# Patient Record
Sex: FEMALE | Race: WHITE | NOT HISPANIC OR LATINO
[De-identification: names, ages, dates, MRNs, and addresses within clinical notes are randomized per-mention and may not be internally consistent; named-entity substitution may affect disease eponyms.]

---

## 2018-03-14 ENCOUNTER — APPOINTMENT (OUTPATIENT)
Dept: ORTHOPEDIC SURGERY | Facility: CLINIC | Age: 83
End: 2018-03-14
Payer: COMMERCIAL

## 2018-03-14 VITALS — HEIGHT: 66 IN | WEIGHT: 120 LBS | BODY MASS INDEX: 19.29 KG/M2

## 2018-03-14 DIAGNOSIS — Z78.9 OTHER SPECIFIED HEALTH STATUS: ICD-10-CM

## 2018-03-14 PROCEDURE — 99204 OFFICE O/P NEW MOD 45 MIN: CPT

## 2018-03-14 RX ORDER — LEVOTHYROXINE SODIUM 0.03 MG/1
25 TABLET ORAL
Refills: 0 | Status: ACTIVE | COMMUNITY

## 2018-03-14 RX ORDER — METAXALONE 800 MG/1
TABLET ORAL
Refills: 0 | Status: ACTIVE | COMMUNITY

## 2018-03-14 RX ORDER — APIXABAN 2.5 MG/1
2.5 TABLET, FILM COATED ORAL
Refills: 0 | Status: ACTIVE | COMMUNITY

## 2018-03-14 RX ORDER — AMIODARONE HYDROCHLORIDE 400 MG/1
TABLET ORAL
Refills: 0 | Status: ACTIVE | COMMUNITY

## 2018-03-14 RX ORDER — OMEGA-3/DHA/EPA/FISH OIL 300-1000MG
CAPSULE ORAL
Refills: 0 | Status: ACTIVE | COMMUNITY

## 2018-04-09 ENCOUNTER — FORM ENCOUNTER (OUTPATIENT)
Age: 83
End: 2018-04-09

## 2018-04-09 DIAGNOSIS — M79.604 PAIN IN RIGHT LEG: ICD-10-CM

## 2018-04-09 DIAGNOSIS — M79.605 PAIN IN RIGHT LEG: ICD-10-CM

## 2018-04-10 ENCOUNTER — APPOINTMENT (OUTPATIENT)
Dept: MRI IMAGING | Facility: HOSPITAL | Age: 83
End: 2018-04-10
Payer: COMMERCIAL

## 2018-04-10 ENCOUNTER — OUTPATIENT (OUTPATIENT)
Dept: OUTPATIENT SERVICES | Facility: HOSPITAL | Age: 83
LOS: 1 days | End: 2018-04-10
Payer: COMMERCIAL

## 2018-04-10 PROCEDURE — 72158 MRI LUMBAR SPINE W/O & W/DYE: CPT | Mod: 26

## 2018-04-10 PROCEDURE — A9585: CPT

## 2018-04-10 PROCEDURE — 72158 MRI LUMBAR SPINE W/O & W/DYE: CPT

## 2018-04-11 ENCOUNTER — FORM ENCOUNTER (OUTPATIENT)
Age: 83
End: 2018-04-11

## 2018-04-12 ENCOUNTER — APPOINTMENT (OUTPATIENT)
Dept: NEUROSURGERY | Facility: CLINIC | Age: 83
End: 2018-04-12
Payer: COMMERCIAL

## 2018-04-12 ENCOUNTER — OUTPATIENT (OUTPATIENT)
Dept: OUTPATIENT SERVICES | Facility: HOSPITAL | Age: 83
LOS: 1 days | End: 2018-04-12
Payer: COMMERCIAL

## 2018-04-12 VITALS
HEIGHT: 66 IN | TEMPERATURE: 97.4 F | OXYGEN SATURATION: 97 % | WEIGHT: 121 LBS | BODY MASS INDEX: 19.44 KG/M2 | SYSTOLIC BLOOD PRESSURE: 140 MMHG | RESPIRATION RATE: 16 BRPM | DIASTOLIC BLOOD PRESSURE: 85 MMHG | HEART RATE: 57 BPM

## 2018-04-12 DIAGNOSIS — M48.062 SPINAL STENOSIS, LUMBAR REGION WITH NEUROGENIC CLAUDICATION: ICD-10-CM

## 2018-04-12 DIAGNOSIS — Z85.9 PERSONAL HISTORY OF MALIGNANT NEOPLASM, UNSPECIFIED: ICD-10-CM

## 2018-04-12 DIAGNOSIS — M54.16 RADICULOPATHY, LUMBAR REGION: ICD-10-CM

## 2018-04-12 DIAGNOSIS — R26.9 UNSPECIFIED ABNORMALITIES OF GAIT AND MOBILITY: ICD-10-CM

## 2018-04-12 PROCEDURE — 99205 OFFICE O/P NEW HI 60 MIN: CPT

## 2018-04-12 PROCEDURE — 72110 X-RAY EXAM L-2 SPINE 4/>VWS: CPT

## 2018-04-12 PROCEDURE — 72110 X-RAY EXAM L-2 SPINE 4/>VWS: CPT | Mod: 26

## 2018-08-15 ENCOUNTER — INPATIENT (INPATIENT)
Facility: HOSPITAL | Age: 83
LOS: 1 days | Discharge: HOME | DRG: 552 | End: 2018-08-17
Attending: INTERNAL MEDICINE | Admitting: INTERNAL MEDICINE
Payer: COMMERCIAL

## 2018-08-15 VITALS
RESPIRATION RATE: 18 BRPM | TEMPERATURE: 99 F | OXYGEN SATURATION: 97 % | SYSTOLIC BLOOD PRESSURE: 166 MMHG | DIASTOLIC BLOOD PRESSURE: 99 MMHG | HEART RATE: 58 BPM

## 2018-08-15 DIAGNOSIS — Z98.890 OTHER SPECIFIED POSTPROCEDURAL STATES: Chronic | ICD-10-CM

## 2018-08-15 DIAGNOSIS — M48.061 SPINAL STENOSIS, LUMBAR REGION WITHOUT NEUROGENIC CLAUDICATION: ICD-10-CM

## 2018-08-15 DIAGNOSIS — M54.5 LOW BACK PAIN: ICD-10-CM

## 2018-08-15 DIAGNOSIS — I10 ESSENTIAL (PRIMARY) HYPERTENSION: ICD-10-CM

## 2018-08-15 DIAGNOSIS — Z29.9 ENCOUNTER FOR PROPHYLACTIC MEASURES, UNSPECIFIED: ICD-10-CM

## 2018-08-15 DIAGNOSIS — I48.2 CHRONIC ATRIAL FIBRILLATION: ICD-10-CM

## 2018-08-15 DIAGNOSIS — Z91.89 OTHER SPECIFIED PERSONAL RISK FACTORS, NOT ELSEWHERE CLASSIFIED: ICD-10-CM

## 2018-08-15 DIAGNOSIS — E03.9 HYPOTHYROIDISM, UNSPECIFIED: ICD-10-CM

## 2018-08-15 DIAGNOSIS — R63.8 OTHER SYMPTOMS AND SIGNS CONCERNING FOOD AND FLUID INTAKE: ICD-10-CM

## 2018-08-15 DIAGNOSIS — K59.00 CONSTIPATION, UNSPECIFIED: ICD-10-CM

## 2018-08-15 DIAGNOSIS — M43.10 SPONDYLOLISTHESIS, SITE UNSPECIFIED: ICD-10-CM

## 2018-08-15 LAB
ALBUMIN SERPL ELPH-MCNC: 3.5 G/DL — SIGNIFICANT CHANGE UP (ref 3.3–5)
ALP SERPL-CCNC: 72 U/L — SIGNIFICANT CHANGE UP (ref 40–120)
ALT FLD-CCNC: 13 U/L — SIGNIFICANT CHANGE UP (ref 10–45)
ANION GAP SERPL CALC-SCNC: 11 MMOL/L — SIGNIFICANT CHANGE UP (ref 5–17)
APTT BLD: 32.9 SEC — SIGNIFICANT CHANGE UP (ref 27.5–37.4)
AST SERPL-CCNC: 20 U/L — SIGNIFICANT CHANGE UP (ref 10–40)
BASOPHILS NFR BLD AUTO: 0.3 % — SIGNIFICANT CHANGE UP (ref 0–2)
BILIRUB SERPL-MCNC: 0.7 MG/DL — SIGNIFICANT CHANGE UP (ref 0.2–1.2)
BUN SERPL-MCNC: 24 MG/DL — HIGH (ref 7–23)
CALCIUM SERPL-MCNC: 9.9 MG/DL — SIGNIFICANT CHANGE UP (ref 8.4–10.5)
CHLORIDE SERPL-SCNC: 98 MMOL/L — SIGNIFICANT CHANGE UP (ref 96–108)
CO2 SERPL-SCNC: 28 MMOL/L — SIGNIFICANT CHANGE UP (ref 22–31)
CREAT SERPL-MCNC: 1.02 MG/DL — SIGNIFICANT CHANGE UP (ref 0.5–1.3)
EOSINOPHIL NFR BLD AUTO: 0.7 % — SIGNIFICANT CHANGE UP (ref 0–6)
GLUCOSE SERPL-MCNC: 89 MG/DL — SIGNIFICANT CHANGE UP (ref 70–99)
HCT VFR BLD CALC: 35.3 % — SIGNIFICANT CHANGE UP (ref 34.5–45)
HGB BLD-MCNC: 11.7 G/DL — SIGNIFICANT CHANGE UP (ref 11.5–15.5)
INR BLD: 1.12 — SIGNIFICANT CHANGE UP (ref 0.88–1.16)
LYMPHOCYTES # BLD AUTO: 9.8 % — LOW (ref 13–44)
MCHC RBC-ENTMCNC: 31.1 PG — SIGNIFICANT CHANGE UP (ref 27–34)
MCHC RBC-ENTMCNC: 33.1 G/DL — SIGNIFICANT CHANGE UP (ref 32–36)
MCV RBC AUTO: 93.9 FL — SIGNIFICANT CHANGE UP (ref 80–100)
MONOCYTES NFR BLD AUTO: 11.1 % — SIGNIFICANT CHANGE UP (ref 2–14)
NEUTROPHILS NFR BLD AUTO: 78.1 % — HIGH (ref 43–77)
PLATELET # BLD AUTO: 374 K/UL — SIGNIFICANT CHANGE UP (ref 150–400)
POTASSIUM SERPL-MCNC: 4.8 MMOL/L — SIGNIFICANT CHANGE UP (ref 3.5–5.3)
POTASSIUM SERPL-SCNC: 4.8 MMOL/L — SIGNIFICANT CHANGE UP (ref 3.5–5.3)
PROT SERPL-MCNC: 7 G/DL — SIGNIFICANT CHANGE UP (ref 6–8.3)
PROTHROM AB SERPL-ACNC: 12.5 SEC — SIGNIFICANT CHANGE UP (ref 9.8–12.7)
RBC # BLD: 3.76 M/UL — LOW (ref 3.8–5.2)
RBC # FLD: 14.7 % — SIGNIFICANT CHANGE UP (ref 10.3–16.9)
SODIUM SERPL-SCNC: 137 MMOL/L — SIGNIFICANT CHANGE UP (ref 135–145)
WBC # BLD: 7.6 K/UL — SIGNIFICANT CHANGE UP (ref 3.8–10.5)
WBC # FLD AUTO: 7.6 K/UL — SIGNIFICANT CHANGE UP (ref 3.8–10.5)

## 2018-08-15 PROCEDURE — 72100 X-RAY EXAM L-S SPINE 2/3 VWS: CPT | Mod: 26

## 2018-08-15 PROCEDURE — 99285 EMERGENCY DEPT VISIT HI MDM: CPT | Mod: 25

## 2018-08-15 PROCEDURE — 99222 1ST HOSP IP/OBS MODERATE 55: CPT | Mod: GC

## 2018-08-15 PROCEDURE — 99222 1ST HOSP IP/OBS MODERATE 55: CPT

## 2018-08-15 PROCEDURE — 93010 ELECTROCARDIOGRAM REPORT: CPT | Mod: 59

## 2018-08-15 RX ORDER — POLYETHYLENE GLYCOL 3350 17 G/17G
17 POWDER, FOR SOLUTION ORAL
Qty: 0 | Refills: 0 | Status: DISCONTINUED | OUTPATIENT
Start: 2018-08-15 | End: 2018-08-17

## 2018-08-15 RX ORDER — LEVOTHYROXINE SODIUM 125 MCG
0 TABLET ORAL
Qty: 0 | Refills: 0 | COMMUNITY

## 2018-08-15 RX ORDER — LEVOTHYROXINE SODIUM 125 MCG
0.25 TABLET ORAL
Qty: 0 | Refills: 0 | COMMUNITY

## 2018-08-15 RX ORDER — AMIODARONE HYDROCHLORIDE 400 MG/1
0 TABLET ORAL
Qty: 0 | Refills: 0 | COMMUNITY

## 2018-08-15 RX ORDER — AMIODARONE HYDROCHLORIDE 400 MG/1
200 TABLET ORAL
Qty: 0 | Refills: 0 | Status: DISCONTINUED | OUTPATIENT
Start: 2018-08-15 | End: 2018-08-17

## 2018-08-15 RX ORDER — APIXABAN 2.5 MG/1
2.5 TABLET, FILM COATED ORAL EVERY 12 HOURS
Qty: 0 | Refills: 0 | Status: DISCONTINUED | OUTPATIENT
Start: 2018-08-15 | End: 2018-08-17

## 2018-08-15 RX ORDER — DOCUSATE SODIUM 100 MG
100 CAPSULE ORAL THREE TIMES A DAY
Qty: 0 | Refills: 0 | Status: DISCONTINUED | OUTPATIENT
Start: 2018-08-15 | End: 2018-08-17

## 2018-08-15 RX ORDER — SENNA PLUS 8.6 MG/1
2 TABLET ORAL AT BEDTIME
Qty: 0 | Refills: 0 | Status: DISCONTINUED | OUTPATIENT
Start: 2018-08-15 | End: 2018-08-17

## 2018-08-15 RX ORDER — KETOROLAC TROMETHAMINE 30 MG/ML
15 SYRINGE (ML) INJECTION ONCE
Qty: 0 | Refills: 0 | Status: DISCONTINUED | OUTPATIENT
Start: 2018-08-15 | End: 2018-08-15

## 2018-08-15 RX ORDER — LEVOTHYROXINE SODIUM 125 MCG
25 TABLET ORAL DAILY
Qty: 0 | Refills: 0 | Status: DISCONTINUED | OUTPATIENT
Start: 2018-08-15 | End: 2018-08-17

## 2018-08-15 RX ORDER — APIXABAN 2.5 MG/1
0 TABLET, FILM COATED ORAL
Qty: 0 | Refills: 0 | COMMUNITY

## 2018-08-15 RX ORDER — AMIODARONE HYDROCHLORIDE 400 MG/1
200 TABLET ORAL
Qty: 0 | Refills: 0 | COMMUNITY

## 2018-08-15 RX ORDER — APIXABAN 2.5 MG/1
2.5 TABLET, FILM COATED ORAL
Qty: 0 | Refills: 0 | COMMUNITY

## 2018-08-15 RX ADMIN — Medication 100 MILLIGRAM(S): at 22:44

## 2018-08-15 RX ADMIN — Medication 15 MILLIGRAM(S): at 19:12

## 2018-08-15 RX ADMIN — APIXABAN 2.5 MILLIGRAM(S): 2.5 TABLET, FILM COATED ORAL at 22:44

## 2018-08-15 NOTE — H&P ADULT - PROBLEM SELECTOR PROBLEM 3
Chronic low back pain without sciatica, unspecified back pain laterality Chronic atrial fibrillation

## 2018-08-15 NOTE — H&P ADULT - PROBLEM SELECTOR PLAN 4
Blood pressure elevated in ED to 178/101. Patient does not report any history of HTN. Etiology likely 2/2 pain. Asymptomatic at this time, no NA, no visual changes.  - continue to monitor  - if persistent and SBP >180, can consider low-dose hydralazine

## 2018-08-15 NOTE — H&P ADULT - PROBLEM SELECTOR PLAN 2
Spinal stenosis at L3-L4 level with grade I anterolisthesis seen on MRI in April 2018.   - plan as above

## 2018-08-15 NOTE — CONSULT NOTE ADULT - SUBJECTIVE AND OBJECTIVE BOX
HISTORY OF PRESENT ILLNESS:   89yo F w/pmhx Afib, cardiac monitor, bladder CA 20 years ago, s/p L4 laminectomy 2016 in [facility] w/ residual R foot drop. Pt presented to  office in April 2018 w/ spinal stenosis and Grade 1 anterlolisthesis L3-L4 and chronic T12 compression fx. No neurosurgical intervention was recommended by . Pt now presents to ER c/o worsening LBP since end of July.   Pt takes tylenol for pain. Pt tried percocet but that made her too drowsy. She ambulates w/walker. Denies leg radiculopathy, urinary incontinence or numbness in extremities. No recent trauma or falls.       PAST MEDICAL & SURGICAL HISTORY:  H/O laminectomy        Allergies    No Known Allergies    Intolerances        REVIEW OF SYSTEMS  see HPI      MEDICATIONS:  Antibiotics:    Neuro:    Anticoagulation:    OTHER:    IVF:      Vital Signs Last 24 Hrs  T(C): 37.1 (15 Aug 2018 15:58), Max: 37.1 (15 Aug 2018 15:58)  T(F): 98.8 (15 Aug 2018 15:58), Max: 98.8 (15 Aug 2018 15:58)  HR: 58 (15 Aug 2018 15:58) (58 - 58)  BP: 178/101 (15 Aug 2018 16:01) (166/99 - 178/101)  BP(mean): --  RR: 18 (15 Aug 2018 15:58) (18 - 18)  SpO2: 97% (15 Aug 2018 15:58) (97% - 97%)    PHYSICAL EXAM:  AxOx3  + back tenderness to palpation around T12  R foot 3/5 DF, 3+/5PF otherwise 5/5 throughout  sensory grossly intact      LABS:              CULTURES:      RADIOLOGY & ADDITIONAL STUDIES:  < from: MR Lumbar Spine w/wo IV Cont (04.10.18 @ 16:55) >  Advanced multilevel degenerative disc disease and facet arthropathy, as   detailed above.    In this patient is status post prior L4 laminectomy, there is no residual   or recurrent focal disc herniation at L4-5 as clinically questioned.     Greatest spinal canal stenosis is seen at L3-4 where there is grade 1   anterolisthesis, however the central canal is partially decompressed at   this laminectomy level. Multilevel neural foraminal narrowing, as   detailed above.    Chronic T12 compression deformity.    < end of copied text > HISTORY OF PRESENT ILLNESS:   89yo F w/pmhx Afib, cardiac monitor, bladder CA 20 years ago, s/p L4 laminectomy 2016 in [facility] w/ residual R foot drop. Pt presented to  office in April 2018 w/ spinal stenosis and Grade 1 anterlolisthesis L3-L4 and chronic T12 compression fx. No neurosurgical intervention was recommended by . Pt now presents to ER c/o worsening LBP since end of July.   Pt takes tylenol for pain. Pt tried percocet but that made her too drowsy. She ambulates w/walker. Today pt was unable to ambulate due to pain and family called an ambulance to bring her to ER. Pt has been seeing  pain management and pt has tried multiple epidural injection, some w/ relief.  Denies leg radiculopathy, urinary incontinence or numbness in extremities. No recent trauma or falls.       PAST MEDICAL & SURGICAL HISTORY:  H/O laminectomy        Allergies    No Known Allergies    Intolerances        REVIEW OF SYSTEMS  see HPI      MEDICATIONS:  Antibiotics:    Neuro:    Anticoagulation:    OTHER:    IVF:      Vital Signs Last 24 Hrs  T(C): 37.1 (15 Aug 2018 15:58), Max: 37.1 (15 Aug 2018 15:58)  T(F): 98.8 (15 Aug 2018 15:58), Max: 98.8 (15 Aug 2018 15:58)  HR: 58 (15 Aug 2018 15:58) (58 - 58)  BP: 178/101 (15 Aug 2018 16:01) (166/99 - 178/101)  BP(mean): --  RR: 18 (15 Aug 2018 15:58) (18 - 18)  SpO2: 97% (15 Aug 2018 15:58) (97% - 97%)    PHYSICAL EXAM:  AxOx3  + back tenderness to palpation around T12  R foot 2/5 DF, 3+/5 PF otherwise 5/5 throughout  sensory grossly intact      LABS:              CULTURES:      RADIOLOGY & ADDITIONAL STUDIES:  < from: MR Lumbar Spine w/wo IV Cont (04.10.18 @ 16:55) >  Advanced multilevel degenerative disc disease and facet arthropathy, as   detailed above.    In this patient is status post prior L4 laminectomy, there is no residual   or recurrent focal disc herniation at L4-5 as clinically questioned.     Greatest spinal canal stenosis is seen at L3-4 where there is grade 1   anterolisthesis, however the central canal is partially decompressed at   this laminectomy level. Multilevel neural foraminal narrowing, as   detailed above.    Chronic T12 compression deformity.    < end of copied text >

## 2018-08-15 NOTE — H&P ADULT - NSHPREVIEWOFSYSTEMS_GEN_ALL_CORE
No recent illnesses, no fevers or chills, no chest pain or palpitations. ROS otherwise negative except as per HPI.

## 2018-08-15 NOTE — H&P ADULT - PROBLEM SELECTOR PLAN 3
Chronic AFib with implanted cardiac monitor. On home Eliquis and amiodarone. EKG in ED NSR with first degree AV block, LAD.  - continue home Eliquis 2.5mg BID  - continue home amiodarone 200mg BID Chronic AFib with implanted cardiac monitor. On home Eliquis and amiodarone. EKG in ED irregular, bradycardic, prolonged QTc to 492, LAD.  - continue home Eliquis 2.5mg BID  - continue home amiodarone 200mg BID  - consult EP to interrogate patient's implanted cardiac device

## 2018-08-15 NOTE — H&P ADULT - PROBLEM SELECTOR PLAN 1
Patient has a long history of low back pain but presents with acute worsening for the past two weeks. No signs or symptoms of cord compression or disc herniation. She has a known history of spinal stenosis and lumbar compression fracture s/p L4 laminectomy in 2016. Most recent MRI in April 2018 shows spinal stenosis and grade I anterolisthesis at L3-L4, T12 compression fracture, and advanced DJD and facet arthropathy. Low concern for cord compression at this time.  - seen by NSG in ED, no surgical intervention at this time  - assess response to Toradol 15mg IV given in ED  - per NSG recs, patient needs back brace for OOB, pain management consult with Dr. Jean Claude Daigle, and PT and pain control  - PT  - pain control  - f/u XR L-spine Patient has a long history of low back pain but presents with acute worsening for the past two weeks. No signs or symptoms of cord compression or disc herniation. She has a known history of spinal stenosis and lumbar compression fracture s/p L4 laminectomy in 2016. Most recent MRI in April 2018 shows spinal stenosis and grade I anterolisthesis at L3-L4, T12 compression fracture, and advanced DJD and facet arthropathy. Low concern for cord compression at this time.  - seen by NSG in ED, no surgical intervention at this time  - assess response to Toradol 15mg IV given in ED  - per NSG recs, patient needs back brace for OOB, pain management consult with Dr. Jean Claude Daigle, and PT and pain control  - PT  - pain control  - per NSG note attending addendum in NSG note, MRI can be considered but is not necessary at this time as it would not change plan for conservative management  - f/u XR L-spine Patient has a long history of low back pain but presents with acute worsening for the past two weeks. No signs or symptoms of cord compression or disc herniation. She has a known history of spinal stenosis and lumbar compression fracture s/p L4 laminectomy in 2016. Most recent MRI in April 2018 shows spinal stenosis and grade I anterolisthesis at L3-L4, T12 compression fracture, and advanced DJD and facet arthropathy. Low concern for cord compression at this time.  - seen by NSG in ED, no surgical intervention at this time  - assess response to Toradol 15mg IV given in ED  - per NSG recs, patient needs back brace for OOB, pain management consult with Dr. Jean Claude Daigle, and PT and pain control  - PT  - pain control  - per NSG note attending addendum in NSG note, MRI can be considered but is not necessary at this time as it would not change plan for conservative management  - f/u XR L-spine final read -- per prelim read by radiology, patient may have worsening compression at L2/L3/L4 vertebrae Patient has a long history of low back pain but presents with acute worsening for the past two weeks. No signs or symptoms of cord compression or disc herniation. She has a known history of spinal stenosis and lumbar compression fracture s/p L4 laminectomy in 2016. Most recent MRI in April 2018 shows spinal stenosis and grade I anterolisthesis at L3-L4, T12 compression fracture, and advanced DJD and facet arthropathy. Low concern for cord compression at this time.  - seen by NSG in ED, no surgical intervention at this time  - assess response to Toradol 15mg IV given in ED  - per NSG recs, patient needs back brace for OOB, pain management consult with Dr. Jean Claude Daigle, and PT and pain control  - PT  - pain control  - per NSG note attending addendum in NSG note, MRI can be considered but is not necessary at this time as it would not change plan for conservative management  - f/u XR L-spine final read -- per prelim read by radiology, patient may have worsening compression at L2/L3/L4 vertebrae  -Will have discussion with family/NSG in AM regarding possible intervention - would consider IR Kyphoplasty?

## 2018-08-15 NOTE — ED ADULT TRIAGE NOTE - CHIEF COMPLAINT QUOTE
patient BIBA from home complains of non traumatic back pain x 2 weeks now with difficulty ambulating. patient was out of town for 2 weeks unable to see a doctor. hx of laminectomy in 2016. hypertensive in triage, denies hx of HTN. denies chest pain, sob, fever, chills

## 2018-08-15 NOTE — ED PROVIDER NOTE - CONSTITUTIONAL, MLM
normal... frail appearing, well nourished, awake, alert, oriented to person, place, time/situation and in no apparent distress.

## 2018-08-15 NOTE — H&P ADULT - ASSESSMENT
89yo F with PMH of chronic low back pain s/p L4 laminectomy (2016, [facility]) with residual foot drop, AFib with cardiac monitor, hypothyroidism, and bladder cancer (1998), who was brought into the ED by family for worsening low back pain at home resulting in inability to ambulate. Seen by NSG, no surgical intervention. Admitted for pain control and PT.

## 2018-08-15 NOTE — H&P ADULT - HISTORY OF PRESENT ILLNESS
89yo F with PMH of chronic low back pain s/p L4 laminectomy (2016, [facility]) with residual foot drop, AFib with cardiac monitor, hypothyroidism, and bladder cancer (1998), who was brought into the ED by family for worsening low back pain at home resulting in inability to ambulate. Pain has been worsening progressively over two weeks with progressive difficulty walking. No radiculopathy, no paresthesias, no numbness, no bowel or bladder dysfunction. Patient seen by Dr. Frederick with neurosurgery in April, MRI at that time showed spinal stenosis and grade I anterolisthesis at L3-L4, chronic T12 compression fracture, and multilevel degenerative disc disease and facet arthropathy.     ED Course: Vitals on arrival T 98.8, HR 58, /99, RR 18, SpO2 97%. She was seen by NSG who recommended NSAIDs and PT, no surgical intervention at this time. Admitted for inability to manage ADLs and inability of family to take care of her at home.   . 89yo F with PMH of chronic low back pain s/p L4 laminectomy (2016, [facility]) with residual foot drop, AFib with cardiac monitor, hypothyroidism, and bladder cancer (1998), who was brought into the ED by family for worsening low back pain at home resulting in inability to ambulate. Pain worsened acutely two weeks ago, 10/10, sharp, non-radiating, better with rest; patient has been using a wheelchair and a walker since that time. As even trying to get out of bed was becoming unbearable, patient decided to come into the ED. No preceding trauma, no clear inciting factor, but patient reports she had been doing some bending over prior to the exacerbation of her pain. No radiculopathy, no paresthesias, no numbness, no bowel or bladder dysfunction. She does report some mild generalized abdominal discomfort with associated constipation. Last BM 2 days ago. She also reports nausea associated with the pain, no vomiting. She also reports recent onset of lower extremity swelling, for which her doctor prescribed Lasix; she is unsure if this helped. She describes shortness of breath on exertion, though she associates this with her pain. Patient seen by Dr. Frederick with neurosurgery in April, MRI at that time showed spinal stenosis and grade I anterolisthesis at L3-L4, chronic T12 compression fracture, and multilevel degenerative disc disease and facet arthropathy. Tylenol helps her pain minimally; she has tried Percocet but did not tolerate it due to drowsiness and nausea. She has had two epidural injections, last in June 2018, with Dr. Jean Claude Daigle. Last injection did not help alleviate her pain.   ED Course: Vitals on arrival T 98.8, HR 58, /99, RR 18, SpO2 97%. She was seen by NSG who recommended NSAIDs and PT, no surgical intervention at this time. Admitted for inability to manage ADLs and inability of family to take care of her at home. Patient given Toradol 15mg IV x1 in ED and admitted for pain control and physical therapy. 89yo F with PMH of chronic low back pain s/p L4 laminectomy (2016, [facility]) with residual foot drop, AFib with cardiac monitor, hypothyroidism, and bladder cancer (1998), who was brought into the ED by family for worsening low back pain at home resulting in inability to ambulate. Pain worsened acutely two weeks ago, 10/10, sharp, non-radiating, better with rest; patient has been using a wheelchair and a walker since that time. As even trying to get out of bed was becoming unbearable, patient decided to come into the ED. No preceding trauma, no clear inciting factor, but patient reports she had been doing some bending over prior to the exacerbation of her pain. No radiculopathy, no paresthesias, no numbness, no bowel or bladder dysfunction. She does report some mild generalized abdominal discomfort with associated constipation. Last BM 2 days ago. She also reports nausea associated with the pain, no vomiting. She also reports recent onset of lower extremity swelling, for which her doctor prescribed Lasix; she is unsure if this helped. She describes shortness of breath on exertion, though she associates this with her pain. Patient seen by Dr. Frederick with neurosurgery in April, MRI at that time showed spinal stenosis and grade I anterolisthesis at L3-L4, chronic T12 compression fracture, and multilevel degenerative disc disease and facet arthropathy. Tylenol helps her pain minimally; she has tried Percocet but did not tolerate it due to drowsiness and nausea. She has had two epidural injections, last in June 2018, with Dr. Jean Claude Daigle. Last injection did not help alleviate her pain.   ED Course: Vitals on arrival T 98.8, HR 58, /99, RR 18, SpO2 97%. She was seen by NSG who recommended NSAIDs and PT, no surgical intervention at this time. Admitted for inability to manage ADLs and inability of family to take care of her at home. Patient given Toradol 15mg IV x1 in ED and admitted for pain control and physical therapy.    FH: Denies cardiac history in father

## 2018-08-15 NOTE — ED ADULT NURSE NOTE - NSIMPLEMENTINTERV_GEN_ALL_ED
Implemented All Fall with Harm Risk Interventions:  [place] to call system. Call bell, personal items and telephone within reach. Instruct patient to call for assistance. Room bathroom lighting operational. Non-slip footwear when patient is off stretcher. Physically safe environment: no spills, clutter or unnecessary equipment. Stretcher in lowest position, wheels locked, appropriate side rails in place. Provide visual cue, wrist band, yellow gown, etc. Monitor gait and stability. Monitor for mental status changes and reorient to person, place, and time. Review medications for side effects contributing to fall risk. Reinforce activity limits and safety measures with patient and family. Provide visual clues: red socks.

## 2018-08-15 NOTE — H&P ADULT - NSHPSOCIALHISTORY_GEN_ALL_CORE
Lives at home with her . Previously worked as a sculptor. Remote smoking history, 15 pack-years, stopped in 1960. No other drug use. Drinks about one alcoholic beverage a day with dinner.

## 2018-08-15 NOTE — H&P ADULT - NSHPPHYSICALEXAM_GEN_ALL_CORE
PHYSICAL EXAM:  General: thin elderly female, NAD, lying in bed, appears comfortable, cooperative with exam  HEENT: NCAT, PERRL, EOMI, no conjunctival pallor or scleral icterus, MM dry, good dentition  Neck: supple, no LAD or thyromegaly, no JVD appreciated  Respiratory: no increased work of breathing, CTAB, no w/r/r appreciated  Cardiovascular: irregular rhythm, regular rate, 2/6 GWEN at RUSB, normal S1/S2, no r/g appreciated  Abdomen: soft, NTND, +BS, no guarding or rebound tenderness, no palpable masses   Extremities: WWP, no cyanosis or clubbing, no edema  Vascular: radial pulses and DP 1+ bilaterally   Neurological: AAOx3, CN II-XII intact, strength 4/5 in bilateral upper and lower extremities, sensation to light touch intact in all extremities, patient unable to sit forward 2/2 back pain, unable to assess midline tenderness; SLR negative though patient describes pain in R hip with L SLR, no TTP over either hip; gait deferred  Skin: no gross skin abnormalities or rashes PHYSICAL EXAM:  General: thin elderly female, NAD, lying in bed, appears comfortable, cooperative with exam  HEENT: NCAT, PERRL, EOMI, no conjunctival pallor or scleral icterus, MM dry, good dentition  Neck: supple, no LAD or thyromegaly, no JVD appreciated  Respiratory: no increased work of breathing, CTAB, no w/r/r appreciated  Cardiovascular: irregular rhythm, regular rate, 2/6 GWEN at RUSB, normal S1/S2, no r/g appreciated  Abdomen: soft, NTND, +BS, no guarding or rebound tenderness, no palpable masses   Extremities: WWP, no cyanosis or clubbing, no edema  Vascular: radial pulses and DP 1+ bilaterally   Neurological: AAOx3, CN II-XII intact, strength 4/5 in bilateral upper and lower extremities, sensation to light touch intact in all extremities, patient unable to sit forward >45 degrees 2/2 severe sharp midline low back pain, unable to assess midline tenderness; SLR negative though patient describes pain in R hip with L SLR, no TTP over either hip; gait deferred  Skin: no gross skin abnormalities or rashes

## 2018-08-15 NOTE — ED PROVIDER NOTE - OBJECTIVE STATEMENT
history of chronic back pain here with increased pain for the past 2-3 weeks.  Denies trauma, fever/chills, bowel/bladder dysfunction.  States the pain has progressed to the point she can't walk anymore.  Taking tylenol at home without relief.  Has tried percocet, tramadol, tylenol #3 in the past but didn't tolerate the side effects.  Has had epidural injections but don't seem to help anymore.  Was seen by Dr. Frederick who recommended medical/ not surgical management. Last MRI done in April showing advanced DJD, prior L4 laminectomy, spinal stenosis.  Pain increased with movement, better with rest

## 2018-08-15 NOTE — ED PROVIDER NOTE - MEDICAL DECISION MAKING DETAILS
acute on chronic low back pain.  now unable to do adl's at home.  no focal neuro deficit/ signs of cauda equina.  seen by neurosurgery service in the ED who recommended lumbar xray, admission to medicine for pain control with nsaids, brace, and eval by pain management/ PT.  family agreeable and states they cannot manage her at home in the current state

## 2018-08-15 NOTE — H&P ADULT - NSHPLABSRESULTS_GEN_ALL_CORE
LABS:                        11.7   7.6   )-----------( 374      ( 15 Aug 2018 17:16 )             35.3     08-15    137  |  98  |  24<H>  ----------------------------<  89  4.8   |  28  |  1.02    Ca    9.9      15 Aug 2018 17:16    TPro  7.0  /  Alb  3.5  /  TBili  0.7  /  DBili  x   /  AST  20  /  ALT  13  /  AlkPhos  72  08-15    PT/INR - ( 15 Aug 2018 17:16 )   PT: 12.5 sec;   INR: 1.12     PTT - ( 15 Aug 2018 17:16 )  PTT:32.9 sec      RADIOLOGY & ADDITIONAL TESTS:  XR L-spine 8/15/18  pending official read    MR Lumbar Spine w/wo IV Cont (04.10.18 @ 16:55)  IMPRESSION:   1. Advanced multilevel degenerative disc disease and facet arthropathy, as detailed above.  2. In this patient is status post prior L4 laminectomy, there is no residual or recurrent focal disc herniation at L4-5 as clinically questioned.   3. Greatest spinal canal stenosis is seen at L3-4 where there is grade 1 anterolisthesis, however the central canal is partially decompressed at this laminectomy level. Multilevel neural foraminal narrowing, as detailed above.  4. Chronic T12 compression deformity. LABS:                        11.7   7.6   )-----------( 374      ( 15 Aug 2018 17:16 )             35.3     08-15    137  |  98  |  24<H>  ----------------------------<  89  4.8   |  28  |  1.02    Ca    9.9      15 Aug 2018 17:16    TPro  7.0  /  Alb  3.5  /  TBili  0.7  /  DBili  x   /  AST  20  /  ALT  13  /  AlkPhos  72  08-15    PT/INR - ( 15 Aug 2018 17:16 )   PT: 12.5 sec;   INR: 1.12     PTT - ( 15 Aug 2018 17:16 )  PTT:32.9 sec      RADIOLOGY & ADDITIONAL TESTS:  XR L-spine 8/15/18  pending official read    MR Lumbar Spine w/wo IV Cont (04.10.18 @ 16:55)  IMPRESSION:   1. Advanced multilevel degenerative disc disease and facet arthropathy.  2. In this patient is status post prior L4 laminectomy, there is no residual or recurrent focal disc herniation at L4-5 as clinically questioned.   3. Greatest spinal canal stenosis is seen at L3-4 where there is grade 1 anterolisthesis, however the central canal is partially decompressed at this laminectomy level. Multilevel neural foraminal narrowing, as detailed above.  4. Chronic T12 compression deformity. LABS:                        11.7   7.6   )-----------( 374      ( 15 Aug 2018 17:16 )             35.3     08-15    137  |  98  |  24<H>  ----------------------------<  89  4.8   |  28  |  1.02    Ca    9.9      15 Aug 2018 17:16    TPro  7.0  /  Alb  3.5  /  TBili  0.7  /  DBili  x   /  AST  20  /  ALT  13  /  AlkPhos  72  08-15    PT/INR - ( 15 Aug 2018 17:16 )   PT: 12.5 sec;   INR: 1.12     PTT - ( 15 Aug 2018 17:16 )  PTT:32.9 sec      RADIOLOGY & ADDITIONAL TESTS:  XR L-spine 8/15/18, final read pending  Per prelim read by radiology, patient may have worsening compression at L2/L3/L4 vertebrae.    MR Lumbar Spine w/wo IV Cont (04.10.18 @ 16:55)  IMPRESSION:   1. Advanced multilevel degenerative disc disease and facet arthropathy.  2. In this patient is status post prior L4 laminectomy, there is no residual or recurrent focal disc herniation at L4-5 as clinically questioned.   3. Greatest spinal canal stenosis is seen at L3-4 where there is grade 1 anterolisthesis, however the central canal is partially decompressed at this laminectomy level. Multilevel neural foraminal narrowing, as detailed above.  4. Chronic T12 compression deformity.

## 2018-08-15 NOTE — CONSULT NOTE ADULT - ATTENDING COMMENTS
Patient seen and examined by me. She has known chronic degenerative lumbar spondylosis. Review of images from April this year show that she has moderate focal stenosis above her previous laminectomy at L3-4. There is also a chronic T12 compression fracture as well as multilevel degenerative changes. She has previously been evaluated as an outpatient and advised to seeks conservative measures. On examination she has no focal deficits but her examination is pain limited. She seems to be quite comfortable while in bed. Given her advanced age and medical comorbidities, no neurosurgical intervention to be offered for these chronic spine issues. Recommend lumbar AP and lateral x-rays to evaluate status of compression fractures/alignment changes,  TLSO back brace to be used prn for comfort, evaluation by pain management, physical therapy. Family requesting MRI which is fine but not likely to change the conservative management recommendations above.    Kade Zhang M.D.  Neurosurgery

## 2018-08-15 NOTE — H&P ADULT - PROBLEM SELECTOR PLAN 6
Patient reports worsening constipation over past 1-2 weeks with no BM for 6 days until 2 days prior to admission.  - TSH  - Colace, senna  - advance bowel regimen as tolerated

## 2018-08-15 NOTE — H&P ADULT - PROBLEM SELECTOR PROBLEM 1
Spinal stenosis of lumbar region without neurogenic claudication Chronic low back pain without sciatica, unspecified back pain laterality

## 2018-08-15 NOTE — H&P ADULT - PMH
Anterolisthesis    Atrial fibrillation    Bladder cancer    Hypothyroidism    Spinal stenosis of lumbar region without neurogenic claudication

## 2018-08-16 LAB
ANION GAP SERPL CALC-SCNC: 13 MMOL/L — SIGNIFICANT CHANGE UP (ref 5–17)
BUN SERPL-MCNC: 25 MG/DL — HIGH (ref 7–23)
CALCIUM SERPL-MCNC: 9.4 MG/DL — SIGNIFICANT CHANGE UP (ref 8.4–10.5)
CHLORIDE SERPL-SCNC: 101 MMOL/L — SIGNIFICANT CHANGE UP (ref 96–108)
CO2 SERPL-SCNC: 26 MMOL/L — SIGNIFICANT CHANGE UP (ref 22–31)
CREAT SERPL-MCNC: 1.05 MG/DL — SIGNIFICANT CHANGE UP (ref 0.5–1.3)
GLUCOSE SERPL-MCNC: 92 MG/DL — SIGNIFICANT CHANGE UP (ref 70–99)
HCT VFR BLD CALC: 34.7 % — SIGNIFICANT CHANGE UP (ref 34.5–45)
HGB BLD-MCNC: 11.2 G/DL — LOW (ref 11.5–15.5)
MAGNESIUM SERPL-MCNC: 2.2 MG/DL — SIGNIFICANT CHANGE UP (ref 1.6–2.6)
MCHC RBC-ENTMCNC: 31 PG — SIGNIFICANT CHANGE UP (ref 27–34)
MCHC RBC-ENTMCNC: 32.3 G/DL — SIGNIFICANT CHANGE UP (ref 32–36)
MCV RBC AUTO: 96.1 FL — SIGNIFICANT CHANGE UP (ref 80–100)
PHOSPHATE SERPL-MCNC: 3.1 MG/DL — SIGNIFICANT CHANGE UP (ref 2.5–4.5)
PLATELET # BLD AUTO: 370 K/UL — SIGNIFICANT CHANGE UP (ref 150–400)
POTASSIUM SERPL-MCNC: 3.9 MMOL/L — SIGNIFICANT CHANGE UP (ref 3.5–5.3)
POTASSIUM SERPL-SCNC: 3.9 MMOL/L — SIGNIFICANT CHANGE UP (ref 3.5–5.3)
RBC # BLD: 3.61 M/UL — LOW (ref 3.8–5.2)
RBC # FLD: 14.8 % — SIGNIFICANT CHANGE UP (ref 10.3–16.9)
SODIUM SERPL-SCNC: 140 MMOL/L — SIGNIFICANT CHANGE UP (ref 135–145)
TSH SERPL-MCNC: 3.49 UIU/ML — SIGNIFICANT CHANGE UP (ref 0.35–4.94)
WBC # BLD: 6.5 K/UL — SIGNIFICANT CHANGE UP (ref 3.8–10.5)
WBC # FLD AUTO: 6.5 K/UL — SIGNIFICANT CHANGE UP (ref 3.8–10.5)

## 2018-08-16 PROCEDURE — 99233 SBSQ HOSP IP/OBS HIGH 50: CPT | Mod: GC

## 2018-08-16 RX ORDER — KETOROLAC TROMETHAMINE 30 MG/ML
15 SYRINGE (ML) INJECTION ONCE
Qty: 0 | Refills: 0 | Status: DISCONTINUED | OUTPATIENT
Start: 2018-08-16 | End: 2018-08-16

## 2018-08-16 RX ORDER — TRAMADOL HYDROCHLORIDE 50 MG/1
50 TABLET ORAL EVERY 6 HOURS
Qty: 0 | Refills: 0 | Status: DISCONTINUED | OUTPATIENT
Start: 2018-08-16 | End: 2018-08-17

## 2018-08-16 RX ORDER — LIDOCAINE 4 G/100G
1 CREAM TOPICAL DAILY
Qty: 0 | Refills: 0 | Status: DISCONTINUED | OUTPATIENT
Start: 2018-08-16 | End: 2018-08-17

## 2018-08-16 RX ORDER — HYDROCHLOROTHIAZIDE 25 MG
12.5 TABLET ORAL DAILY
Qty: 0 | Refills: 0 | Status: DISCONTINUED | OUTPATIENT
Start: 2018-08-16 | End: 2018-08-17

## 2018-08-16 RX ORDER — ACETAMINOPHEN 500 MG
1000 TABLET ORAL ONCE
Qty: 0 | Refills: 0 | Status: COMPLETED | OUTPATIENT
Start: 2018-08-16 | End: 2018-08-16

## 2018-08-16 RX ADMIN — APIXABAN 2.5 MILLIGRAM(S): 2.5 TABLET, FILM COATED ORAL at 22:33

## 2018-08-16 RX ADMIN — AMIODARONE HYDROCHLORIDE 200 MILLIGRAM(S): 400 TABLET ORAL at 17:34

## 2018-08-16 RX ADMIN — Medication 100 MILLIGRAM(S): at 22:33

## 2018-08-16 RX ADMIN — POLYETHYLENE GLYCOL 3350 17 GRAM(S): 17 POWDER, FOR SOLUTION ORAL at 05:17

## 2018-08-16 RX ADMIN — LIDOCAINE 1 PATCH: 4 CREAM TOPICAL at 17:40

## 2018-08-16 RX ADMIN — TRAMADOL HYDROCHLORIDE 50 MILLIGRAM(S): 50 TABLET ORAL at 19:00

## 2018-08-16 RX ADMIN — Medication 15 MILLIGRAM(S): at 07:51

## 2018-08-16 RX ADMIN — POLYETHYLENE GLYCOL 3350 17 GRAM(S): 17 POWDER, FOR SOLUTION ORAL at 17:34

## 2018-08-16 RX ADMIN — Medication 25 MICROGRAM(S): at 05:17

## 2018-08-16 RX ADMIN — Medication 400 MILLIGRAM(S): at 16:09

## 2018-08-16 RX ADMIN — Medication 100 MILLIGRAM(S): at 13:19

## 2018-08-16 RX ADMIN — Medication 100 MILLIGRAM(S): at 05:17

## 2018-08-16 RX ADMIN — AMIODARONE HYDROCHLORIDE 200 MILLIGRAM(S): 400 TABLET ORAL at 05:17

## 2018-08-16 RX ADMIN — Medication 1000 MILLIGRAM(S): at 16:25

## 2018-08-16 RX ADMIN — TRAMADOL HYDROCHLORIDE 50 MILLIGRAM(S): 50 TABLET ORAL at 17:59

## 2018-08-16 RX ADMIN — Medication 12.5 MILLIGRAM(S): at 13:19

## 2018-08-16 RX ADMIN — APIXABAN 2.5 MILLIGRAM(S): 2.5 TABLET, FILM COATED ORAL at 10:07

## 2018-08-16 NOTE — PROGRESS NOTE ADULT - PROBLEM SELECTOR PLAN 8
DVT ppx: continue home Eliquis  no GI ppx indicated at this time    Clarify code status with patient and family in AM    Dispo: [facility] for PT DVT ppx: continue home Eliquis  no GI ppx indicated at this time    Full Code    Dispo: [facility] for PT

## 2018-08-16 NOTE — PROGRESS NOTE ADULT - PROBLEM SELECTOR PLAN 5
On home levothyroxine.  - continue home levothyroxine 25mcg qd  - TSH On home levothyroxine.  - continue home levothyroxine 25mcg qd  - TSH normal 3.49

## 2018-08-16 NOTE — PHYSICAL THERAPY INITIAL EVALUATION ADULT - ADDITIONAL COMMENTS
Patient lives with spouse in elevator building. Patient lives with spouse in elevator building. Uses RW to ambulate and recently using wheelchair due to pain and inability to ambulate. Patient has grab bars in bathroom. Was able to ambulate and perform ADL independently prior to increased symptoms. Patient uses RLE AFO.

## 2018-08-16 NOTE — PROGRESS NOTE ADULT - PROBLEM SELECTOR PLAN 9
1) PCP Contacted on Admission: (Y/N) --> Dr. Flores, [facility], [phone]  2) Date of Contact with PCP: will contact in AM  3) PCP Contacted at Discharge: (Y/N)  4) Summary of Handoff Given to PCP:   5) Post-Discharge Appointment Date and Location: 1) PCP Contacted on Admission: (Y/N) --> Dr. Flores, [facility], [phone]  2) Date of Contact with PCP: 8/16/2018  3) PCP Contacted at Discharge: (Y/N)  4) Summary of Handoff Given to PCP:   5) Post-Discharge Appointment Date and Location:

## 2018-08-16 NOTE — PROGRESS NOTE ADULT - PROBLEM SELECTOR PLAN 4
Blood pressure elevated in ED to 178/101. Patient does not report any history of HTN. Etiology likely 2/2 pain. Asymptomatic at this time, no NA, no visual changes.  - continue to monitor  - if persistent and SBP >180, can consider low-dose hydralazine Blood pressure elevated in ED to 178/101. Patient does not report any history of HTN. Etiology likely 2/2 pain. Asymptomatic at this time, no NA, no visual changes.  - started on HCTZ 12.5mg qd  - continue to monitor

## 2018-08-16 NOTE — CONSULT NOTE ADULT - SUBJECTIVE AND OBJECTIVE BOX
Patient is a 89y old  Female who presents with a chief complaint of low back pain (15 Aug 2018 18:34)       HPI:  89yo F with PMH of chronic low back pain s/p L4 laminectomy (2016, [facility]) with residual foot drop, AFib with cardiac monitor, hypothyroidism, and bladder cancer (1998), who was brought into the ED by family for worsening low back pain at home resulting in inability to ambulate. Pain worsened acutely two weeks ago, 10/10, sharp, non-radiating, better with rest; patient has been using a wheelchair and a walker since that time. As even trying to get out of bed was becoming unbearable, patient decided to come into the ED. No preceding trauma, no clear inciting factor, but patient reports she had been doing some bending over prior to the exacerbation of her pain. No radiculopathy, no paresthesias, no numbness, no bowel or bladder dysfunction. She does report some mild generalized abdominal discomfort with associated constipation. Last BM 2 days ago. She also reports nausea associated with the pain, no vomiting. She also reports recent onset of lower extremity swelling, for which her doctor prescribed Lasix; she is unsure if this helped. She describes shortness of breath on exertion, though she associates this with her pain. Patient seen by Dr. Frederick with neurosurgery in April, MRI at that time showed spinal stenosis and grade I anterolisthesis at L3-L4, chronic T12 compression fracture, and multilevel degenerative disc disease and facet arthropathy. Tylenol helps her pain minimally; she has tried Percocet but did not tolerate it due to drowsiness and nausea. She has had two epidural injections, last in June 2018, with Dr. Jean Claude Daigle. Last injection did not help alleviate her pain.   ED Course: Vitals on arrival T 98.8, HR 58, /99, RR 18, SpO2 97%. She was seen by NSG who recommended NSAIDs and PT, no surgical intervention at this time. Admitted for inability to manage ADLs and inability of family to take care of her at home. Patient given Toradol 15mg IV x1 in ED and admitted for pain control and physical therapy.    FH: Denies cardiac history in father (15 Aug 2018 18:34)      PAST MEDICAL & SURGICAL HISTORY:  Anterolisthesis  Spinal stenosis of lumbar region without neurogenic claudication  Bladder cancer  Atrial fibrillation  Hypothyroidism  History of bladder surgery  H/O laminectomy      MEDICATIONS  (STANDING):  amiodarone    Tablet 200 milliGRAM(s) Oral two times a day  apixaban 2.5 milliGRAM(s) Oral every 12 hours  docusate sodium 100 milliGRAM(s) Oral three times a day  levothyroxine 25 MICROGram(s) Oral daily  polyethylene glycol 3350 17 Gram(s) Oral two times a day    MEDICATIONS  (PRN):  senna 2 Tablet(s) Oral at bedtime PRN Constipation  traMADol 50 milliGRAM(s) Oral every 6 hours PRN Moderate Pain (4 - 6)      Social History per patient: lives with her  in an elevator accessible apartment building    Functional Level Prior to Admission per patient: difficulties with ADL's  secondary to pain, walks with a walker    FAMILY HISTORY:  No pertinent family history in first degree relatives      CBC Full  -  ( 16 Aug 2018 06:54 )  WBC Count : 6.5 K/uL  Hemoglobin : 11.2 g/dL  Hematocrit : 34.7 %  Platelet Count - Automated : 370 K/uL  Mean Cell Volume : 96.1 fL  Mean Cell Hemoglobin : 31.0 pg  Mean Cell Hemoglobin Concentration : 32.3 g/dL  Auto Neutrophil # : x  Auto Lymphocyte # : x  Auto Monocyte # : x  Auto Eosinophil # : x  Auto Basophil # : x  Auto Neutrophil % : x  Auto Lymphocyte % : x  Auto Monocyte % : x  Auto Eosinophil % : x  Auto Basophil % : x      08-16    140  |  101  |  25<H>  ----------------------------<  92  3.9   |  26  |  1.05    Ca    9.4      16 Aug 2018 06:54  Phos  3.1     08-16  Mg     2.2     08-16    TPro  7.0  /  Alb  3.5  /  TBili  0.7  /  DBili  x   /  AST  20  /  ALT  13  /  AlkPhos  72  08-15            Radiology:              Vital Signs Last 24 Hrs  T(C): 36.9 (16 Aug 2018 05:07), Max: 37.1 (15 Aug 2018 15:58)  T(F): 98.5 (16 Aug 2018 05:07), Max: 98.8 (15 Aug 2018 15:58)  HR: 54 (16 Aug 2018 05:07) (53 - 58)  BP: 169/90 (16 Aug 2018 05:07) (156/89 - 184/95)  BP(mean): --  RR: 20 (16 Aug 2018 05:07) (18 - 20)  SpO2: 98% (16 Aug 2018 05:07) (95% - 98%)    REVIEW OF SYSTEMS:    CONSTITUTIONAL:  fatigue  EYES: No eye pain, visual disturbances, or discharge  ENMT:  No difficulty hearing, tinnitus, vertigo; No sinus or throat pain  NECK: No pain or stiffness  BREASTS: No pain, masses, or nipple discharge  RESPIRATORY: No cough, wheezing, chills or hemoptysis; No shortness of breath  CARDIOVASCULAR: No chest pain, palpitations, dizziness, or leg swelling  GASTROINTESTINAL: No abdominal or epigastric pain. No nausea, vomiting, or hematemesis; No diarrhea or constipation. No melena or hematochezia.  GENITOURINARY: No dysuria, frequency, hematuria, or incontinence  NEUROLOGICAL: No headaches, memory loss, loss of strength, numbness, or tremors  SKIN: No itching, burning, rashes, or lesions   LYMPH NODES: No enlarged glands  ENDOCRINE: No heat or cold intolerance; No hair loss  MUSCULOSKELETAL: back pain  PSYCHIATRIC: No depression, anxiety, mood swings, or difficulty sleeping  HEME/LYMPH: No easy bruising, or bleeding gums  ALLERGY AND IMMUNOLOGIC: No hives or eczema  VASCULAR: no swelling, erythema      Physical Exam: frail 89 yo  woman lying in semi Dhaliwal's position, c/o back pain    Head: normocephalic, atraumatic    Eyes: PERRLA, EOMI, no nystagmus, sclera anicteric    ENT: nasal discharge, uvula midline, no oropharyngeal erythema/exudate    Neck: supple, negative JVD, negative carotid bruits, no thyromegaly    Chest: CTA bilaterally, neg wheeze, rhonchi, rales, crackles, egophany    Cardiovascular: irregular rate and rhythm, II/VI systolic murmur    Abdomen: soft, non distended, non tender, negative rebound/guarding, normal bowel sounds, neg hepatosplenomegaly    Extremities: WWP, neg cyanosis/clubbing/edema, negative calf tenderness to palpation, negative Harmeet's sign    Spine: neg ecchymosis/rash/lesions, T 10-12 L3-5 tenderness, neg spasms, neg straight leg raise    :     Neurologic Exam:    Alert and oriented to person, place, date/year, speech fluent w/o dysarthria, repetition intact, comprehension intact,     Cranial Nerves:     II:                       pupils equal, round and reactive to light, visual fields intact   III/ IV/VI:            extraocular movements intact, neg nystagmus, ptosis  V:                       facial sensation intact, V1-3 normal  VII:                     face symmetric, no droop, normal eye closure and smile  VIII:                    hearing intact to finger rub bilaterally  IX/ X:                 soft palate rise symmetrical  XI:                      head turning, shoulder shrug normal  XII:                     tongue midline    Motor Exam:    Upper Extremities:              Right:       4/5                                                          negative pronator drift                                                Left:      4/5                                                          negative pronator drift      Lower Extremities:            Right:           4/5 hip flexors/adductors/abductors                                                           5/5 quadriceps/hamstrings                                                           2+-3-/5 dorsiflexors/plantar flexors/invertors-evertors                                               Left:       4/5 hip flexors/adductors/abductors                                                            5/5 quadriceps/hamstrings                                                            5/5 dorsiflexors/plantar flexors/invertors-evertors    Sensory:              intact to LT/PP in all UE/LE dermatomes    DTR:                   = biceps/     triceps/     brachioradialis                            = patella/   medial hamstring/    ankle                            neg clonus                            neg Babinski                            neg Hoffmans    Romberg:  not tested    Tandem Walking:  not tested    Gait:  not tested        PM&R Impression:    1) thoracic / lumbar myofascial pain  2) T12 compression fx/ lumbar spinal stenosis/ DDD  3) chronic right foot drop  4) deconditioned  5) gait dysfunction      Recommendations:    1) Physical therapy focusing on therapeutic exercises, bed mobility/transfer out of bed evaluation, progressive ambulation with assistive devices.    2) Anticipated Disposition Plan/Recommendations: pending functional progress

## 2018-08-16 NOTE — PHYSICAL THERAPY INITIAL EVALUATION ADULT - CRITERIA FOR SKILLED THERAPEUTIC INTERVENTIONS
functional limitations in following categories/impairments found/anticipated discharge recommendation/rehab potential/therapy frequency

## 2018-08-16 NOTE — PHYSICAL THERAPY INITIAL EVALUATION ADULT - PERTINENT HX OF CURRENT PROBLEM, REHAB EVAL
89yo F with PMH of chronic low back pain s/p L4 laminectomy (2016, [facility]) with residual foot drop, AFib with cardiac monitor, hypothyroidism, and bladder cancer (1998), who was brought into the ED by family for worsening low back pain at home resulting in inability to ambulate. Seen by NSG, no surgical intervention. Admitted for pain control and PT.

## 2018-08-16 NOTE — CONSULT NOTE ADULT - ASSESSMENT
89yo F with PMH of chronic low back pain s/p L4 laminectomy (2016, [facility]) with residual foot drop, AFib with cardiac monitor, hypothyroidism, and bladder cancer (1998), who was brought into the ED by family for worsening low back pain at home resulting in inability to ambulate. Seen by NSG, no surgical intervention. Admitted for pain control and PT.    Problem/Plan - 1:  ·  Problem: Chronic low back pain without sciatica, unspecified back pain laterality.  Plan: Patient has a long history of low back pain but presents with acute worsening for the past two weeks. No signs or symptoms of cord compression or disc herniation. She has a known history of spinal stenosis and lumbar compression fracture s/p L4 laminectomy in 2016. Most recent MRI in April 2018 shows spinal stenosis and grade I anterolisthesis at L3-L4, T12 compression fracture, and advanced DJD and facet arthropathy. Low concern for cord compression at this time.  - seen by NSG in ED, no surgical intervention at this time  - per NSG recs, patient needs back brace for OOB, pain management consult with Dr. Jean Claude Daigle pain control  - per NSG note attending addendum in NSG note, MRI can be considered but is not necessary at this time as it would not change plan for conservative management  - f/u XR L-spine final read -- per prelim read by radiology, patient may have worsening compression at L2/L3/L4 vertebrae  - will have discussion with family/NSG in AM regarding possible intervention - would consider IR Kyphoplasty?  - seen by Dr. Daigle with pain management this morning, appreciate recs: Tramadol 50mg q6h PRN, PT, brace, outpatient follow up for possible facet injections  - Tylenol IV PRN severe pain, particularly for PT, brace fitting.     Problem/Plan - 2:  ·  Problem: Spinal stenosis of lumbar region without neurogenic claudication.  Plan: Spinal stenosis at L3-L4 level with grade I anterolisthesis seen on MRI in April 2018.   - plan as above.
A/P:  No Neurosurgical intervention recommended  Conservative management with NSAIDs and physical therapy  Order Brace for OOB from [facility] [phone]  Dr.Kiran Daigle pain management consult  Xray L spine Ap/lat  Pt seen in ER with

## 2018-08-16 NOTE — PROGRESS NOTE ADULT - PROBLEM SELECTOR PLAN 1
Patient has a long history of low back pain but presents with acute worsening for the past two weeks. No signs or symptoms of cord compression or disc herniation. She has a known history of spinal stenosis and lumbar compression fracture s/p L4 laminectomy in 2016. Most recent MRI in April 2018 shows spinal stenosis and grade I anterolisthesis at L3-L4, T12 compression fracture, and advanced DJD and facet arthropathy. Low concern for cord compression at this time.  - seen by NSG in ED, no surgical intervention at this time  - per NSG recs, patient needs back brace for OOB, pain management consult with Dr. Jean Claude Daigle, and PT and pain control  - PT  - per NSG note attending addendum in NSG note, MRI can be considered but is not necessary at this time as it would not change plan for conservative management  - f/u XR L-spine final read -- per prelim read by radiology, patient may have worsening compression at L2/L3/L4 vertebrae  - will have discussion with family/NSG in AM regarding possible intervention - would consider IR Kyphoplasty?  - seen by Dr. Daigle with pain management this morning, appreciate recs: Tramadol 50mg q6h PRN, PT, brace, outpatient follow up for possible facet injections  - Tylenol IV PRN severe pain, particularly for PT, brace fitting Patient has a long history of low back pain but presents with acute worsening for the past two weeks. No signs or symptoms of cord compression or disc herniation. She has a known history of spinal stenosis and lumbar compression fracture s/p L4 laminectomy in 2016. Most recent MRI in April 2018 shows spinal stenosis and grade I anterolisthesis at L3-L4, T12 compression fracture, and advanced DJD and facet arthropathy. Low concern for cord compression at this time.  - seen by NSG in ED, no surgical intervention at this time  - per NSG recs, patient needs back brace for OOB, pain management consult with Dr. Jean Claude Daigle, and PT and pain control  - PT  - per NSG note attending addendum in NSG note, MRI can be considered but is not necessary at this time as it would not change plan for conservative management  - XR L-spine with new worsening compression of L2 vertebral body, severe osteopenia, chronic T12 compression fracture   - seen by Dr. Daigle with pain management this morning, appreciate recs: Tramadol 50mg q6h PRN, acetaminophen IV PRN severe pain, PT, TLSO brace, outpatient follow up for possible facet injections  - acetaminophen IV PRN severe pain, particularly for PT, brace fitting per Dr. Daigle recs

## 2018-08-16 NOTE — PROGRESS NOTE ADULT - PROBLEM SELECTOR PLAN 3
Chronic AFib with implanted cardiac monitor. On home Eliquis and amiodarone. EKG in ED irregular, bradycardic, prolonged QTc to 492, LAD.  - continue home Eliquis 2.5mg BID  - continue home amiodarone 200mg BID  - consult EP to interrogate patient's implanted cardiac device Chronic AFib with implanted cardiac monitor. On home Eliquis and amiodarone. EKG in ED irregular, bradycardic, prolonged QTc to 492, LAD.  - continue home Eliquis 2.5mg BID  - continue home amiodarone 200mg BID

## 2018-08-16 NOTE — PROGRESS NOTE ADULT - SUBJECTIVE AND OBJECTIVE BOX
OVERNIGHT EVENTS: No acute events overnight.  INTERVAL HISTORY: Patient reports feeling well this morning. She denies any current pain or pain overnight. She was able to sleep. Denies chest pain, shortness of breath, HA, blurry vision, dizziness, lightheadedness, fevers/chills, abdominal pain, nausea or vomiting.      Vital Signs Last 24 Hrs  T(C): 36.9 (16 Aug 2018 05:07), Max: 37.1 (15 Aug 2018 15:58)  T(F): 98.5 (16 Aug 2018 05:07), Max: 98.8 (15 Aug 2018 15:58)  HR: 54 (16 Aug 2018 05:07) (53 - 58)  BP: 169/90 (16 Aug 2018 05:07) (156/89 - 184/95)  RR: 20 (16 Aug 2018 05:07) (18 - 20)  SpO2: 98% (16 Aug 2018 05:07) (95% - 98%)      PHYSICAL EXAM:  General: thin elderly female, NAD, sleeping, easily arouses to voice, appears comfortable, cooperative with exam  HEENT: NCAT, PERRL, EOMI, no conjunctival pallor or scleral icterus, MM dry, good dentition  Neck: supple, no LAD or thyromegaly, no JVD appreciated  Respiratory: no increased work of breathing, CTAB, no w/r/r appreciated  Cardiovascular: irregular rhythm, regular rate, 2/6 GWEN at RUSB, normal S1/S2, no r/g appreciated  Abdomen: soft, NTND, +BS, no guarding or rebound tenderness, no palpable masses   Extremities: WWP, no cyanosis or clubbing, no edema  Vascular: radial pulses and DP 1+ bilaterally   Neurological: AAOx3, CN II-XII intact, strength 4/5 in bilateral upper and lower extremities, sensation to light touch intact in all extremities  Skin: no gross skin abnormalities or rashes      LABS:                        11.2   6.5   )-----------( 370      ( 16 Aug 2018 06:54 )             34.7     08-16    140  |  101  |  25<H>  ----------------------------<  92  3.9   |  26  |  1.05    Ca    9.4      16 Aug 2018 06:54  Phos  3.1     08-16  Mg     2.2     08-16    TPro  7.0  /  Alb  3.5  /  TBili  0.7  /  DBili  x   /  AST  20  /  ALT  13  /  AlkPhos  72  08-15    PT/INR - ( 15 Aug 2018 17:16 )   PT: 12.5 sec;   INR: 1.12      PTT - ( 15 Aug 2018 17:16 )  PTT:32.9 sec      RADIOLOGY & ADDITIONAL TESTS:  XR L-spine pending final read      MEDICATIONS  (STANDING):  amiodarone    Tablet 200 milliGRAM(s) Oral two times a day  apixaban 2.5 milliGRAM(s) Oral every 12 hours  docusate sodium 100 milliGRAM(s) Oral three times a day  levothyroxine 25 MICROGram(s) Oral daily  polyethylene glycol 3350 17 Gram(s) Oral two times a day    MEDICATIONS  (PRN):  senna 2 Tablet(s) Oral at bedtime PRN Constipation OVERNIGHT EVENTS: No acute events overnight.  INTERVAL HISTORY: Patient reports feeling well this morning. She denies any current pain or pain overnight. She was able to sleep. Denies chest pain, shortness of breath, HA, blurry vision, dizziness, lightheadedness, fevers/chills, abdominal pain, nausea or vomiting.      Vital Signs Last 24 Hrs  T(C): 36.9 (16 Aug 2018 05:07), Max: 37.1 (15 Aug 2018 15:58)  T(F): 98.5 (16 Aug 2018 05:07), Max: 98.8 (15 Aug 2018 15:58)  HR: 54 (16 Aug 2018 05:07) (53 - 58)  BP: 169/90 (16 Aug 2018 05:07) (156/89 - 184/95)  RR: 20 (16 Aug 2018 05:07) (18 - 20)  SpO2: 98% (16 Aug 2018 05:07) (95% - 98%)      PHYSICAL EXAM:  General: thin elderly female, NAD, sleeping, easily arouses to voice, appears comfortable, cooperative with exam  HEENT: NCAT, PERRL, EOMI, no conjunctival pallor or scleral icterus, MM dry, good dentition  Neck: supple, no LAD or thyromegaly, no JVD appreciated  Respiratory: no increased work of breathing, CTAB, no w/r/r appreciated  Cardiovascular: irregular rhythm, regular rate, 2/6 GWEN at RUSB, normal S1/S2, no r/g appreciated  Abdomen: soft, NTND, +BS, no guarding or rebound tenderness, no palpable masses   Extremities: WWP, no cyanosis or clubbing, no edema  Vascular: radial pulses and DP 1+ bilaterally   Neurological: AAOx3, CN II-XII intact, strength 4/5 in bilateral upper and lower extremities, sensation to light touch intact in all extremities, no TTP over spine or paraspinal muscles  Skin: no gross skin abnormalities or rashes      LABS:                        11.2   6.5   )-----------( 370      ( 16 Aug 2018 06:54 )             34.7     08-16    140  |  101  |  25<H>  ----------------------------<  92  3.9   |  26  |  1.05    Ca    9.4      16 Aug 2018 06:54  Phos  3.1     08-16  Mg     2.2     08-16    TPro  7.0  /  Alb  3.5  /  TBili  0.7  /  DBili  x   /  AST  20  /  ALT  13  /  AlkPhos  72  08-15    PT/INR - ( 15 Aug 2018 17:16 )   PT: 12.5 sec;   INR: 1.12      PTT - ( 15 Aug 2018 17:16 )  PTT:32.9 sec      RADIOLOGY & ADDITIONAL TESTS:  Xray Lumbar Spine AP + Lateral (08.15.18 @ 19:43)  FINDINGS:  There is profound osteopenia and poor visualization of the lumbar spine on the lateral view. Despite this, there is visible compression deformity of the L2 vertebral body that is new/increased from prior imaging. Anterior wedge compressiondeformity of T12 appears similar. There is questionable deformity of T12 that seems to correlate with the appearance on prior imaging as well. Multilevel degenerative disc disease and advanced facet arthropathy is again seen. The patient is status post prior L4 laminectomy.  IMPRESSION:   Limited study. Compression deformity of the L2 vertebral body, new/increased compared to this April.      MEDICATIONS  (STANDING):  amiodarone    Tablet 200 milliGRAM(s) Oral two times a day  apixaban 2.5 milliGRAM(s) Oral every 12 hours  docusate sodium 100 milliGRAM(s) Oral three times a day  levothyroxine 25 MICROGram(s) Oral daily  polyethylene glycol 3350 17 Gram(s) Oral two times a day    MEDICATIONS  (PRN):  senna 2 Tablet(s) Oral at bedtime PRN Constipation

## 2018-08-17 ENCOUNTER — TRANSCRIPTION ENCOUNTER (OUTPATIENT)
Age: 83
End: 2018-08-17

## 2018-08-17 VITALS
SYSTOLIC BLOOD PRESSURE: 119 MMHG | OXYGEN SATURATION: 95 % | HEART RATE: 52 BPM | RESPIRATION RATE: 16 BRPM | DIASTOLIC BLOOD PRESSURE: 75 MMHG | TEMPERATURE: 98 F

## 2018-08-17 LAB
ANION GAP SERPL CALC-SCNC: 12 MMOL/L — SIGNIFICANT CHANGE UP (ref 5–17)
BUN SERPL-MCNC: 21 MG/DL — SIGNIFICANT CHANGE UP (ref 7–23)
CALCIUM SERPL-MCNC: 9.5 MG/DL — SIGNIFICANT CHANGE UP (ref 8.4–10.5)
CHLORIDE SERPL-SCNC: 96 MMOL/L — SIGNIFICANT CHANGE UP (ref 96–108)
CO2 SERPL-SCNC: 27 MMOL/L — SIGNIFICANT CHANGE UP (ref 22–31)
CREAT SERPL-MCNC: 1.03 MG/DL — SIGNIFICANT CHANGE UP (ref 0.5–1.3)
GLUCOSE SERPL-MCNC: 94 MG/DL — SIGNIFICANT CHANGE UP (ref 70–99)
HCT VFR BLD CALC: 35.1 % — SIGNIFICANT CHANGE UP (ref 34.5–45)
HGB BLD-MCNC: 11.6 G/DL — SIGNIFICANT CHANGE UP (ref 11.5–15.5)
MCHC RBC-ENTMCNC: 31.1 PG — SIGNIFICANT CHANGE UP (ref 27–34)
MCHC RBC-ENTMCNC: 33 G/DL — SIGNIFICANT CHANGE UP (ref 32–36)
MCV RBC AUTO: 94.1 FL — SIGNIFICANT CHANGE UP (ref 80–100)
PLATELET # BLD AUTO: 373 K/UL — SIGNIFICANT CHANGE UP (ref 150–400)
POTASSIUM SERPL-MCNC: 3.6 MMOL/L — SIGNIFICANT CHANGE UP (ref 3.5–5.3)
POTASSIUM SERPL-SCNC: 3.6 MMOL/L — SIGNIFICANT CHANGE UP (ref 3.5–5.3)
RBC # BLD: 3.73 M/UL — LOW (ref 3.8–5.2)
RBC # FLD: 14.3 % — SIGNIFICANT CHANGE UP (ref 10.3–16.9)
SODIUM SERPL-SCNC: 135 MMOL/L — SIGNIFICANT CHANGE UP (ref 135–145)
WBC # BLD: 7 K/UL — SIGNIFICANT CHANGE UP (ref 3.8–10.5)
WBC # FLD AUTO: 7 K/UL — SIGNIFICANT CHANGE UP (ref 3.8–10.5)

## 2018-08-17 PROCEDURE — 99239 HOSP IP/OBS DSCHRG MGMT >30: CPT

## 2018-08-17 PROCEDURE — 82746 ASSAY OF FOLIC ACID SERUM: CPT

## 2018-08-17 PROCEDURE — 97161 PT EVAL LOW COMPLEX 20 MIN: CPT

## 2018-08-17 PROCEDURE — 85025 COMPLETE CBC W/AUTO DIFF WBC: CPT

## 2018-08-17 PROCEDURE — 84443 ASSAY THYROID STIM HORMONE: CPT

## 2018-08-17 PROCEDURE — 72100 X-RAY EXAM L-S SPINE 2/3 VWS: CPT

## 2018-08-17 PROCEDURE — 83735 ASSAY OF MAGNESIUM: CPT

## 2018-08-17 PROCEDURE — 80053 COMPREHEN METABOLIC PANEL: CPT

## 2018-08-17 PROCEDURE — 36415 COLL VENOUS BLD VENIPUNCTURE: CPT

## 2018-08-17 PROCEDURE — 80048 BASIC METABOLIC PNL TOTAL CA: CPT

## 2018-08-17 PROCEDURE — 82607 VITAMIN B-12: CPT

## 2018-08-17 PROCEDURE — 99285 EMERGENCY DEPT VISIT HI MDM: CPT | Mod: 25

## 2018-08-17 PROCEDURE — 85730 THROMBOPLASTIN TIME PARTIAL: CPT

## 2018-08-17 PROCEDURE — 85610 PROTHROMBIN TIME: CPT

## 2018-08-17 PROCEDURE — 84100 ASSAY OF PHOSPHORUS: CPT

## 2018-08-17 PROCEDURE — 93005 ELECTROCARDIOGRAM TRACING: CPT

## 2018-08-17 PROCEDURE — 85027 COMPLETE CBC AUTOMATED: CPT

## 2018-08-17 RX ORDER — ACETAMINOPHEN WITH CODEINE 300MG-30MG
5 TABLET ORAL
Qty: 0 | Refills: 0 | COMMUNITY
Start: 2018-08-17

## 2018-08-17 RX ORDER — SENNA PLUS 8.6 MG/1
2 TABLET ORAL
Qty: 0 | Refills: 0 | COMMUNITY
Start: 2018-08-17

## 2018-08-17 RX ORDER — ACETAMINOPHEN 500 MG
2 TABLET ORAL
Qty: 0 | Refills: 0 | COMMUNITY
Start: 2018-08-17

## 2018-08-17 RX ORDER — ACETAMINOPHEN WITH CODEINE 300MG-30MG
5 TABLET ORAL EVERY 6 HOURS
Qty: 0 | Refills: 0 | Status: DISCONTINUED | OUTPATIENT
Start: 2018-08-17 | End: 2018-08-17

## 2018-08-17 RX ORDER — POTASSIUM CHLORIDE 20 MEQ
40 PACKET (EA) ORAL ONCE
Qty: 0 | Refills: 0 | Status: COMPLETED | OUTPATIENT
Start: 2018-08-17 | End: 2018-08-17

## 2018-08-17 RX ORDER — LIDOCAINE 4 G/100G
1 CREAM TOPICAL
Qty: 0 | Refills: 0 | COMMUNITY
Start: 2018-08-17

## 2018-08-17 RX ORDER — ACETAMINOPHEN 500 MG
650 TABLET ORAL ONCE
Qty: 0 | Refills: 0 | Status: COMPLETED | OUTPATIENT
Start: 2018-08-17 | End: 2018-08-17

## 2018-08-17 RX ORDER — DOCUSATE SODIUM 100 MG
1 CAPSULE ORAL
Qty: 0 | Refills: 0 | COMMUNITY
Start: 2018-08-17

## 2018-08-17 RX ORDER — POLYETHYLENE GLYCOL 3350 17 G/17G
17 POWDER, FOR SOLUTION ORAL
Qty: 0 | Refills: 0 | COMMUNITY
Start: 2018-08-17

## 2018-08-17 RX ADMIN — APIXABAN 2.5 MILLIGRAM(S): 2.5 TABLET, FILM COATED ORAL at 09:12

## 2018-08-17 RX ADMIN — Medication 650 MILLIGRAM(S): at 19:48

## 2018-08-17 RX ADMIN — TRAMADOL HYDROCHLORIDE 50 MILLIGRAM(S): 50 TABLET ORAL at 00:37

## 2018-08-17 RX ADMIN — Medication 25 MICROGRAM(S): at 07:13

## 2018-08-17 RX ADMIN — AMIODARONE HYDROCHLORIDE 200 MILLIGRAM(S): 400 TABLET ORAL at 09:12

## 2018-08-17 RX ADMIN — Medication 650 MILLIGRAM(S): at 18:44

## 2018-08-17 RX ADMIN — Medication 100 MILLIGRAM(S): at 07:13

## 2018-08-17 RX ADMIN — POLYETHYLENE GLYCOL 3350 17 GRAM(S): 17 POWDER, FOR SOLUTION ORAL at 07:13

## 2018-08-17 RX ADMIN — Medication 12.5 MILLIGRAM(S): at 07:13

## 2018-08-17 RX ADMIN — TRAMADOL HYDROCHLORIDE 50 MILLIGRAM(S): 50 TABLET ORAL at 01:37

## 2018-08-17 RX ADMIN — LIDOCAINE 1 PATCH: 4 CREAM TOPICAL at 11:34

## 2018-08-17 RX ADMIN — Medication 40 MILLIEQUIVALENT(S): at 09:12

## 2018-08-17 RX ADMIN — LIDOCAINE 1 PATCH: 4 CREAM TOPICAL at 05:40

## 2018-08-17 NOTE — PROGRESS NOTE ADULT - ASSESSMENT
89yo F with PMH of chronic low back pain s/p L4 laminectomy (2016, [facility]) with residual foot drop, AFib with cardiac monitor, hypothyroidism, and bladder cancer (1998), who was brought into the ED by family for worsening low back pain at home resulting in inability to ambulate. Seen by NSG, no surgical intervention. Admitted for pain control and PT.    Problem/Plan - 1:  ·  Problem: Chronic low back pain without sciatica, unspecified back pain laterality.  Plan: Patient has a long history of low back pain but presents with acute worsening for the past two weeks. No signs or symptoms of cord compression or disc herniation. She has a known history of spinal stenosis and lumbar compression fracture s/p L4 laminectomy in 2016. Most recent MRI in April 2018 shows spinal stenosis and grade I anterolisthesis at L3-L4, T12 compression fracture, and advanced DJD and facet arthropathy. Low concern for cord compression at this time.  - seen by NSG in ED, no surgical intervention at this time -- recommend TSLO brace, pain management with Dr. Jean Claude Daigle, PT, pain control  - PT recommends [facility]Y vs. rehab, follow up  - per NSG attending addendum, MRI can be considered but is not necessary at this time as it would not change plan for conservative management  - XR L-spine with new worsening compression of L2 vertebral body, severe osteopenia, chronic T12 compression fracture   - seen by Dr. Daigle with pain management, appreciate recs  - patient does not think Tramadol or acetaminophen IV helped with her pain  - lidocaine patch  - trial Tylenol with codeine 5mg q6h PRN today per Dr. Daigle's recs   - given TLSO brace yesterday  - outpatient follow up with Dr. Daigle for possible facet injections, not a candidate for kyphoplasty  - acetaminophen IV PRN severe pain, particularly for PT.     Problem/Plan - 2:  ·  Problem: Spinal stenosis of lumbar region without neurogenic claudication.  Plan: Spinal stenosis at L3-L4 level with grade I anterolisthesis seen on MRI in April 2018.   - plan as above.     Problem/Plan - 3:  ·  Problem: Chronic atrial fibrillation.  Plan: Chronic AFib with implanted cardiac monitor. On home Eliquis and amiodarone. EKG in ED irregular, bradycardic, prolonged QTc to 492, LAD.  - continue home Eliquis 2.5mg BID  - continue home amiodarone 200mg BID.
89yo F with PMH of chronic low back pain s/p L4 laminectomy (2016, [facility]) with residual foot drop, AFib with cardiac monitor, hypothyroidism, and bladder cancer (1998), who was brought into the ED by family for worsening low back pain at home resulting in inability to ambulate. Seen by NSG, no surgical intervention. Admitted for pain control and PT.
89yo F with PMH of chronic low back pain s/p L4 laminectomy (2016, [facility]) with residual foot drop, AFib with cardiac monitor, hypothyroidism, and bladder cancer (1998), who was brought into the ED by family for worsening low back pain at home resulting in inability to ambulate. Seen by NSG, no surgical intervention. Admitted for pain control and PT.

## 2018-08-17 NOTE — PROGRESS NOTE ADULT - SUBJECTIVE AND OBJECTIVE BOX
OVERNIGHT EVENTS: No acute events overnight.  INTERVAL HISTORY: Patient reports that she has had back pain overnight; does not think Tramadol or IV Tylenol have helped. Denies any acute worsening of pain, radiculopathy, paresthesias, bowel or bladder dysfunction. No chest pain, SOB, palpitations, abdominal pain, nausea, vomiting.       Vital Signs Last 24 Hrs  T(C): 36.6 (17 Aug 2018 08:36), Max: 37 (16 Aug 2018 09:05)  T(F): 97.8 (17 Aug 2018 08:36), Max: 98.6 (16 Aug 2018 09:05)  HR: 52 (17 Aug 2018 08:36) (52 - 56)  BP: 173/101 (17 Aug 2018 08:36) (166/89 - 183/101)  RR: 18 (17 Aug 2018 08:36) (16 - 20)  SpO2: 97% (17 Aug 2018 08:36) (94% - 97%)      PHYSICAL EXAM:  General: thin elderly female, NAD, sleeping, easily arouses to voice, appears slightly uncomfortable, cooperative with exam  HEENT: NCAT, PERRL, EOMI, no conjunctival pallor or scleral icterus, MMM, good dentition  Neck: supple, no LAD or thyromegaly, no JVD appreciated  Respiratory: no increased work of breathing, CTAB, no w/r/r appreciated  Cardiovascular: bradycardic, regular rhythm, 2/6 GWEN at RUSB, normal S1/S2, no r/g appreciated  Abdomen: soft, NTND, +BS, no guarding or rebound tenderness, no palpable masses   Extremities: WWP, no cyanosis or clubbing, no edema  Vascular: radial pulses and DP 1+ bilaterally   Neurological: AAOx3, CN II-XII intact, strength 4/5 in all extremities, sensation to light touch intact in all extremities, no TTP over spine or paraspinal muscles  Skin: no gross skin abnormalities or rashes      LABS:                        11.6   7.0   )-----------( 373      ( 17 Aug 2018 06:14 )             35.1     08-17    135  |  96  |  21  ----------------------------<  94  3.6   |  27  |  1.03    Ca    9.5      17 Aug 2018 06:14  Phos  3.1     08-16  Mg     2.2     08-16    TPro  7.0  /  Alb  3.5  /  TBili  0.7  /  DBili  x   /  AST  20  /  ALT  13  /  AlkPhos  72  08-15    PT/INR - ( 15 Aug 2018 17:16 )   PT: 12.5 sec;   INR: 1.12     PTT - ( 15 Aug 2018 17:16 )  PTT:32.9 sec      RADIOLOGY & ADDITIONAL TESTS:  No new imaging.      MEDICATIONS  (STANDING):  amiodarone    Tablet 200 milliGRAM(s) Oral two times a day  apixaban 2.5 milliGRAM(s) Oral every 12 hours  docusate sodium 100 milliGRAM(s) Oral three times a day  hydrochlorothiazide 12.5 milliGRAM(s) Oral daily  levothyroxine 25 MICROGram(s) Oral daily  lidocaine   Patch 1 Patch Transdermal daily  polyethylene glycol 3350 17 Gram(s) Oral two times a day  potassium chloride   Powder 40 milliEquivalent(s) Oral once    MEDICATIONS  (PRN):  acetaminophen with codeine Elixir 5 milliLiter(s) Oral every 6 hours PRN Moderate Pain (4 - 6)  senna 2 Tablet(s) Oral at bedtime PRN Constipation

## 2018-08-17 NOTE — PROGRESS NOTE ADULT - PROBLEM SELECTOR PLAN 9
1) PCP Contacted on Admission: (Y/N) --> Dr. Flores, [facility], [phone]  2) Date of Contact with PCP: 8/16/2018  3) PCP Contacted at Discharge: (Y/N)  4) Summary of Handoff Given to PCP:   5) Post-Discharge Appointment Date and Location:

## 2018-08-17 NOTE — DISCHARGE NOTE ADULT - HOSPITAL COURSE
89yo F with PMH of chronic low back pain s/p L4 laminectomy (2016, [facility]) with residual foot drop, AFib with cardiac monitor, hypothyroidism, and bladder cancer (1998), who was brought into the ED by family for worsening low back pain at home resulting in inability to ambulate. No red flags, no radiculopathy, no paresthesias, no weakness. Seen by NSG, no surgical intervention. Admitted for pain control and PT. XR L-spine showed possible worsening compression of L2 vertebral body, which likely is cause of patient's worsening pain. She was fitted with TLSO brace and seen by PT, who recommended to subacute rehab. Patient was seen by her outpatient pain management physician Dr. Jean Claude Daigle, who recommended PT, Tramadol 50mg q6h PRN moderate pain, and Tylenol 1000mg IV PRN severe pain. Patient reported these medications were not helping her pain significantly, so she was switched to Tylenol with codeine, which may be continued upon discharge. Patient has tried Percocet in the past but did not tolerate it due to nausea and drowsiness. She may follow up as an outpatient with Dr. Daigle to be evaluated for possible facet injections. 89yo F with PMH of chronic low back pain s/p L4 laminectomy (2016, [facility]) with residual foot drop, AFib with cardiac monitor, hypothyroidism, and bladder cancer (1998), who was brought into the ED by family for worsening low back pain at home resulting in inability to ambulate. No red flags, no radiculopathy, no paresthesias, no weakness. Seen by NSG, no surgical intervention. Admitted for pain control and PT. XR L-spine showed possible worsening compression of L2 vertebral body, which likely is cause of patient's worsening pain. She was fitted with TLSO brace and seen by PT, who recommended to subacute rehab. Patient was seen by her outpatient pain management physician Dr. Jean Claude Daigle, who recommended PT, Tramadol 50mg q6h PRN moderate pain, and Tylenol 1000mg IV PRN severe pain. Patient reported these medications were not helping her pain significantly, so she was switched to Tylenol with codeine, which may be continued upon discharge; she may be given Tylenol without codeine if she prefers, though this does not seem to help her back pain significantly. Patient has tried Percocet in the past but did not tolerate it due to nausea and drowsiness. She may follow up as an outpatient with Dr. Daigle to be evaluated for possible facet injections.

## 2018-08-17 NOTE — PROGRESS NOTE ADULT - PROBLEM SELECTOR PROBLEM 2
Spinal stenosis of lumbar region without neurogenic claudication
Spinal stenosis of lumbar region without neurogenic claudication

## 2018-08-17 NOTE — DISCHARGE NOTE ADULT - PATIENT PORTAL LINK FT
You can access the [facility] Patient Portal, offered by [facility], by registering with the following website: [email or web address]

## 2018-08-17 NOTE — PROGRESS NOTE ADULT - PROBLEM SELECTOR PROBLEM 1
Chronic low back pain without sciatica, unspecified back pain laterality
Chronic low back pain without sciatica, unspecified back pain laterality

## 2018-08-17 NOTE — DISCHARGE NOTE ADULT - NSCORESITESY/N_GEN_A_CORE_RD
You have been diagnosed with an upper respiratory infection (URI) which is likely viral. Viruses are not killed by antibiotics and therefore an antibiotic is not prescribed for you today. A viral illness can last between 3 and 14 days, and symptoms may persist the entire course of illness. Symptomatic treatment is best.  Take Ibuprofen or Tylenol as needed for pain or fever. A saline nasal spray may be used to help keep your nose clear from discharge. Be sure that you are increasing your intake of clear, decaffeinated fluids and get plenty of rest. If your symptoms worsen or persist, follow up with your primary care provider. Pt verbalized understanding of plan, visit summary given.    MYESHA Goldstein      
No

## 2018-08-17 NOTE — DISCHARGE NOTE ADULT - PROVIDER TOKENS
FREE:[LAST:[Flores],FIRST:[MD Jace],PHONE:[phone]],FAX:[phone]    -],ADDRESS:[[place] [address] [address] [facility], [address]]]

## 2018-08-17 NOTE — PROGRESS NOTE ADULT - PROBLEM SELECTOR PLAN 4
Blood pressure elevated in ED to 178/101. Patient does not report any history of HTN. Etiology likely 2/2 pain. Asymptomatic at this time, no NA, no visual changes.  - started on HCTZ 12.5mg qd  - continue to monitor

## 2018-08-17 NOTE — DISCHARGE NOTE ADULT - CARE PLAN
Principal Discharge DX:	Chronic low back pain without sciatica, unspecified back pain laterality  Goal:	Treat symptoms  Assessment and plan of treatment:	You came into the hospital with worsening back pain, ultimately causing inability to walk due to pain. X-ray showed worsening compression at the L2 level of your spine, which may be the cause of your worsening symptoms. You were seen by neurosurgery, who said there are no surgical options for you. We fitted you with a TLSO brace at their recommendations. You were seen by physical therapy, who recommended rehab. We are sending you to rehab with Tylenol with codeine at the recommendations of your pain management specialist, Dr. Jean Claude Daigle. You may follow up with her as an outpatient to explore further pain management options, including facet injections.  Secondary Diagnosis:	Chronic atrial fibrillation  Assessment and plan of treatment:	Continue home amiodarone and Eliquis as prescribed.  Secondary Diagnosis:	Hypothyroidism  Assessment and plan of treatment:	Continue home levothyroxine as prescribed.  Secondary Diagnosis:	T12 compression fracture  Assessment and plan of treatment:	You have an old T12 compression fracture. X-ray also showed worsening compression at the L2 level of your spine, which is likely the cause of your new worsening symptoms, as above.  Secondary Diagnosis:	Hypertension, unspecified type  Assessment and plan of treatment:	Your blood pressure was elevated to 170-180s/80-100s during your admission. You were started on a medication called hydrochlorothiazide, which should be continued during rehab and after you return home. Follow up with your PCP for continued management after discharge from rehab

## 2018-08-17 NOTE — PROGRESS NOTE ADULT - PROBLEM SELECTOR PLAN 1
Patient has a long history of low back pain but presents with acute worsening for the past two weeks. No signs or symptoms of cord compression or disc herniation. She has a known history of spinal stenosis and lumbar compression fracture s/p L4 laminectomy in 2016. Most recent MRI in April 2018 shows spinal stenosis and grade I anterolisthesis at L3-L4, T12 compression fracture, and advanced DJD and facet arthropathy. Low concern for cord compression at this time.  - seen by NSG in ED, no surgical intervention at this time  - per NSG recs, patient needs back brace for OOB, pain management consult with Dr. Jean Claude Daigle, and PT and pain control  - PT  - per NSG note attending addendum in NSG note, MRI can be considered but is not necessary at this time as it would not change plan for conservative management  - XR L-spine with new worsening compression of L2 vertebral body, severe osteopenia, chronic T12 compression fracture   - seen by Dr. Daigle with pain management, appreciate recs  - patient does not think Tramadol or acetaminophen IV helped with her pain  - trial Tylenol with codeine 5mg q6h PRN today per Dr. Daigle's recs   - given TLSO brace yesterday  - outpatient follow up with Dr. Daigle for possible facet injections, not a candidate for kyphoplasty  - acetaminophen IV PRN severe pain, particularly for PT Patient has a long history of low back pain but presents with acute worsening for the past two weeks. No signs or symptoms of cord compression or disc herniation. She has a known history of spinal stenosis and lumbar compression fracture s/p L4 laminectomy in 2016. Most recent MRI in April 2018 shows spinal stenosis and grade I anterolisthesis at L3-L4, T12 compression fracture, and advanced DJD and facet arthropathy. Low concern for cord compression at this time.  - seen by NSG in ED, no surgical intervention at this time -- recommend TSLO brace, pain management with Dr. Jean Claude Daigle, PT, pain control  - PT recommends [facility]Y vs. rehab, follow up  - per NSG attending addendum, MRI can be considered but is not necessary at this time as it would not change plan for conservative management  - XR L-spine with new worsening compression of L2 vertebral body, severe osteopenia, chronic T12 compression fracture   - seen by Dr. Daigle with pain management, appreciate recs  - patient does not think Tramadol or acetaminophen IV helped with her pain  - lidocaine patch  - trial Tylenol with codeine 5mg q6h PRN today per Dr. Daigle's recs   - given TLSO brace yesterday  - outpatient follow up with Dr. Daigle for possible facet injections, not a candidate for kyphoplasty  - acetaminophen IV PRN severe pain, particularly for PT

## 2018-08-17 NOTE — PROGRESS NOTE ADULT - SUBJECTIVE AND OBJECTIVE BOX
Physical Medicine and Rehabilitation Progress Note:    Patient is a 89y old  Female who presents with a chief complaint of low back pain (15 Aug 2018 18:34)      HPI:  89yo F with PMH of chronic low back pain s/p L4 laminectomy (2016, [facility]) with residual foot drop, AFib with cardiac monitor, hypothyroidism, and bladder cancer (1998), who was brought into the ED by family for worsening low back pain at home resulting in inability to ambulate. Pain worsened acutely two weeks ago, 10/10, sharp, non-radiating, better with rest; patient has been using a wheelchair and a walker since that time. As even trying to get out of bed was becoming unbearable, patient decided to come into the ED. No preceding trauma, no clear inciting factor, but patient reports she had been doing some bending over prior to the exacerbation of her pain. No radiculopathy, no paresthesias, no numbness, no bowel or bladder dysfunction. She does report some mild generalized abdominal discomfort with associated constipation. Last BM 2 days ago. She also reports nausea associated with the pain, no vomiting. She also reports recent onset of lower extremity swelling, for which her doctor prescribed Lasix; she is unsure if this helped. She describes shortness of breath on exertion, though she associates this with her pain. Patient seen by Dr. Frederick with neurosurgery in April, MRI at that time showed spinal stenosis and grade I anterolisthesis at L3-L4, chronic T12 compression fracture, and multilevel degenerative disc disease and facet arthropathy. Tylenol helps her pain minimally; she has tried Percocet but did not tolerate it due to drowsiness and nausea. She has had two epidural injections, last in June 2018, with Dr. Jean Claude Daigle. Last injection did not help alleviate her pain.   ED Course: Vitals on arrival T 98.8, HR 58, /99, RR 18, SpO2 97%. She was seen by NSG who recommended NSAIDs and PT, no surgical intervention at this time. Admitted for inability to manage ADLs and inability of family to take care of her at home. Patient given Toradol 15mg IV x1 in ED and admitted for pain control and physical therapy.    FH: Denies cardiac history in father (15 Aug 2018 18:34)                            11.6   7.0   )-----------( 373      ( 17 Aug 2018 06:14 )             35.1       08-17    135  |  96  |  21  ----------------------------<  94  3.6   |  27  |  1.03    Ca    9.5      17 Aug 2018 06:14  Phos  3.1     08-16  Mg     2.2     08-16    TPro  7.0  /  Alb  3.5  /  TBili  0.7  /  DBili  x   /  AST  20  /  ALT  13  /  AlkPhos  72  08-15    Vital Signs Last 24 Hrs  T(C): 36.6 (17 Aug 2018 08:36), Max: 36.8 (16 Aug 2018 15:52)  T(F): 97.8 (17 Aug 2018 08:36), Max: 98.3 (17 Aug 2018 05:34)  HR: 52 (17 Aug 2018 08:36) (52 - 52)  BP: 173/101 (17 Aug 2018 08:36) (166/89 - 183/101)  BP(mean): --  RR: 18 (17 Aug 2018 08:36) (16 - 20)  SpO2: 97% (17 Aug 2018 08:36) (94% - 97%)    MEDICATIONS  (STANDING):  amiodarone    Tablet 200 milliGRAM(s) Oral two times a day  apixaban 2.5 milliGRAM(s) Oral every 12 hours  docusate sodium 100 milliGRAM(s) Oral three times a day  hydrochlorothiazide 12.5 milliGRAM(s) Oral daily  levothyroxine 25 MICROGram(s) Oral daily  lidocaine   Patch 1 Patch Transdermal daily  polyethylene glycol 3350 17 Gram(s) Oral two times a day    MEDICATIONS  (PRN):  acetaminophen with codeine Elixir 5 milliLiter(s) Oral every 6 hours PRN Moderate Pain (4 - 6)  senna 2 Tablet(s) Oral at bedtime PRN Constipation    Currently Undergoing Physical Therapy at bedside.    Functional Status Assessment:      Previous Level of Function:     · Ambulation Skills	independent; needs device; RW	  · Transfer Skills	independent; needs device; RW	  · ADL Skills	independent; needs device	  · Work/Leisure Activity	independent	  · Additional Comments	Patient lives with spouse in elevator building. Uses RW to ambulate and recently using wheelchair due to pain and inability to ambulate. Patient has grab bars in bathroom. Was able to ambulate and perform ADL independently prior to increased symptoms. Patient uses RLE AFO.	  		    Cognitive Status Examination:   · Orientation	oriented to person, place, time and situation	  · Level of Consciousness	alert	  · Follows Commands and Answers Questions	100% of the time	  · Personal Safety and Judgment	intact	    Range of Motion Exam:   · Range of Motion Examination	no ROM deficits were identified	    MMT Hip:     · Hip Flexion	(3-) fair minus, left  (3-) fair minus, right  limited by pain  limited by pain 	  · Hip ABduction	(3-) fair minus, left  (3-) fair minus, right  limited by pain  limited by pain 	  · Hip ADduction	(3-) fair minus, left  (3-) fair minus, right  limited by pain  limited by pain 	    MMT Knee:     · Knee Flexion	(3+) fair plus, left  (3+) fair plus, right 	  · Knee Extension	(3+) fair plus, left  (3+) fair plus, right 	    MMT Ankle:     · Ankle Dorsiflexion	(3+) fair plus, left  (2+) poor plus, right  baseline 	    Muscle Tone Assessment:   · Muscle Tone Assessment	left-side extremities; right-side extremities; normal	    Bed Mobility: Rolling/Turning:     · Level of [place]	maximum assist (25% patients effort)	  · Physical Assist/Nonphysical Assist	1 person assist; verbal cues; supervision	    Bed Mobility: Scooting/Bridging:     · Level of [place]	maximum assist (25% patients effort)	  · Physical Assist/Nonphysical Assist	2 person assist; 1 person assist; supervision; verbal cues	    Bed Mobility: Sit to Supine:     · Level of [place]	maximum assist (25% patients effort)	  · Physical Assist/Nonphysical Assist	2 person assist; verbal cues; supervision	    Bed Mobility: Supine to Sit:     · Level of [place]	maximum assist (25% patients effort)	  · Physical Assist/Nonphysical Assist	2 person assist; supervision; verbal cues; set-up required; nonverbal cues (demo/gestures)	    Bed Mobility Analysis:     · Bed Mobility Limitations	decreased ability to use legs for bridging/pushing; impaired ability to control trunk for mobility	  · Impairments Contributing to Impaired Bed Mobility	pain; decreased strength	    Transfer: Sit to Stand:     · Level of [place]	moderate assist (50% patients effort)	  · Physical Assist/Nonphysical Assist	2 person assist; nonverbal cues (demo/gestures); verbal cues	  · Weight-Bearing Restrictions	weight-bearing as tolerated	  · Assistive Device	B Hand Held Assist	    Transfer: Stand to Sit:     · Level of [place]	moderate assist (50% patients effort)	  · Physical Assist/Nonphysical Assist	2 person assist; supervision; verbal cues	  · Weight-Bearing Restrictions	weight-bearing as tolerated	  · Assistive Device	B Hand Held Assist	    Sit/Stand Transfer Safety Analysis:     · Transfer Safety Concerns Noted	decreased weight-shifting ability	  · Impairments Contributing to Impaired Transfers	impaired balance; decreased strength; pain	    Balance Skills Assessment:     · Sitting Balance: Static	fair balance 	  · Sitting Balance: Dynamic	fair balance 	  · Sit-to-Stand Balance	fair balance 	  · Systems Impairment Contributing to Balance Disturbance	musculoskeletal	  · Identified Impairments Contributing to Balance Disturbance	pain; decreased strength	    Sensory Examination:   Sensory Examination:    Grossly Intact:   · Gross Sensory Examination	Grossly Intact; Left UE; Right UE; Left LE; Right LE	      Light Touch Sensation:   · Left UE	within normal limits 	  · Right UE	within normal limits 	  · Left LE	within normal limits 	  · Right LE	mild impairment  increased pain along lateral side with light touch 	      Clinical Impressions:   · Criteria for Skilled Therapeutic Interventions	impairments found; functional limitations in following categories; rehab potential; therapy frequency; anticipated discharge recommendation	  · Impairments Found (describe specific impairments)	aerobic capacity/endurance; gross motor; gait, locomotion, and balance; muscle strength	  · Functional Limitations in Following Categories (describe specific limitations)	self-care; home management; community/leisure	  · Rehab Potential	fair, will monitor progress closely	  · Therapy Frequency	2-3x/week	          PM&R Impression: as above    Disposition Plan Recommendations: subacute rehab placement

## 2018-08-17 NOTE — DISCHARGE NOTE ADULT - PLAN OF CARE
Treat symptoms You came into the hospital with worsening back pain, ultimately causing inability to walk due to pain. X-ray showed worsening compression at the L2 level of your spine, which may be the cause of your worsening symptoms. You were seen by neurosurgery, who said there are no surgical options for you. We fitted you with a TLSO brace at their recommendations. You were seen by physical therapy, who recommended rehab. We are sending you to rehab with Tylenol with codeine at the recommendations of your pain management specialist, Dr. Jean Claude Daigle. You may follow up with her as an outpatient to explore further pain management options, including facet injections. Continue home amiodarone and Eliquis as prescribed. Continue home levothyroxine as prescribed. You have an old T12 compression fracture. X-ray also showed worsening compression at the L2 level of your spine, which is likely the cause of your new worsening symptoms, as above. Your blood pressure was elevated to 170-180s/80-100s during your admission. You were started on a medication called hydrochlorothiazide, which should be continued during rehab and after you return home. Follow up with your PCP for continued management after discharge from rehab

## 2018-08-17 NOTE — PROGRESS NOTE ADULT - PROBLEM SELECTOR PLAN 3
Chronic AFib with implanted cardiac monitor. On home Eliquis and amiodarone. EKG in ED irregular, bradycardic, prolonged QTc to 492, LAD.  - continue home Eliquis 2.5mg BID  - continue home amiodarone 200mg BID

## 2018-08-17 NOTE — PROGRESS NOTE ADULT - PROBLEM SELECTOR PLAN 2
Spinal stenosis at L3-L4 level with grade I anterolisthesis seen on MRI in April 2018.   - plan as above
Spinal stenosis at L3-L4 level with grade I anterolisthesis seen on MRI in April 2018.   - plan as above

## 2018-08-17 NOTE — DISCHARGE NOTE ADULT - ADDITIONAL INSTRUCTIONS
You are being discharged to a rehab facility for continued physical therapy. After your rehab is complete, follow up with your primary care physician for continued management of your medical issues.

## 2018-08-17 NOTE — PROGRESS NOTE ADULT - PROBLEM SELECTOR PLAN 6
Patient reports worsening constipation over past 1-2 weeks with no BM for 6 days until 2 days prior to admission.  - TSH normal  - Colace TID, senna qhs, Miralax BID  - advance bowel regimen as tolerated

## 2018-08-17 NOTE — PROGRESS NOTE ADULT - ATTENDING COMMENTS
Patient seen and examined at bedside. Agree with exam, a/p as above with following addendum:    A/P: 89 year old F w/     1. Back pain: likely 2/2 compression fractures, no acute surgical intervention per neurosurgery, c/w pain control, PT, and back brace, lidocaine patch  2. HTN: elevated, possibly 2/2 to pain but patient currently denies, c/w thiazide, titrate as needed   3. Atrial fibrillation: c/w Eliquis and amio  4. Hypothyroidism: c/w synthroid  5. Dispo: pending SURY placement.
Patient seen and examined at bedside. Agree with exam, a/p as above with following addendum:    Daughter at bedside as well. Patient with no complaints but does not report relief of back pain. Unable to sit up 2/2 to pain. Seen by pain mgt, started on tramadol. Exam notable for spinal tenderness at thoracic spine.     A/P: 89 year old F w/     1. Back pain: likely 2/2 compression fractures, no acute surgical intervention per neurosurgery, c/w pain control, PT, and back brace, lidocaine patch  2. HTN: elevated, possibly 2/2 to pain but patient currently denies, start thiazide if persistently elevated  3. Atrial fibrillation: c/w Eliquis and amio  4. Hypothyroidism: c/w synthroid  5. Dispo: pending SURY placement

## 2018-08-17 NOTE — DISCHARGE NOTE ADULT - MEDICATION SUMMARY - MEDICATIONS TO TAKE
I will START or STAY ON the medications listed below when I get home from the hospital:    TLSO back brace for thoracic and lumbar spine, indicated for chronic T12 compression fracture and acute worsening compression of L2  -- 1 application by transdermal patch once a day   -- Indication: For Chronic low back pain without sciatica, unspecified back pain laterality    acetaminophen-codeine 120 mg-12 mg/5 mL oral liquid  -- 5 milliliter(s) by mouth every 6 hours, As needed, Moderate Pain (4 - 6)  -- Indication: For Chronic low back pain without sciatica, unspecified back pain laterality    amiodarone  -- 200 milligram(s) by mouth 2 times a day  -- Indication: For Atrial fibrillation    Eliquis  -- 2.5 milligram(s) by mouth 2 times a day  -- Indication: For Atrial fibrillation    lidocaine 5% topical film  -- Apply on skin to affected area once a day  -- Indication: For Chronic low back pain without sciatica, unspecified back pain laterality    hydroCHLOROthiazide 12.5 mg oral capsule  -- 1 cap(s) by mouth once a day  -- Indication: For High blood pressure    polyethylene glycol 3350 oral powder for reconstitution  -- 17 gram(s) by mouth 2 times a day  -- Indication: For Constipation, unspecified constipation type    senna oral tablet  -- 2 tab(s) by mouth once a day (at bedtime), As needed, Constipation  -- Indication: For Constipation, unspecified constipation type    docusate sodium 100 mg oral capsule  -- 1 cap(s) by mouth 3 times a day  -- Indication: For Constipation, unspecified constipation type    levothyroxine  -- 0.25 microgram(s) by mouth once a day  -- Indication: For Hypothyroidism I will START or STAY ON the medications listed below when I get home from the hospital:    TLSO back brace for thoracic and lumbar spine, indicated for chronic T12 compression fracture and acute worsening compression of L2  -- 1 application by transdermal patch once a day   -- Indication: For Chronic low back pain without sciatica, unspecified back pain laterality    acetaminophen-codeine 120 mg-12 mg/5 mL oral liquid  -- 5 milliliter(s) by mouth every 6 hours, As needed, Moderate Pain (4 - 6)  -- Indication: For Chronic low back pain without sciatica, unspecified back pain laterality    acetaminophen 325 mg oral tablet  -- 2 tab(s) by mouth once, As needed, Moderate Pain (4 - 6)  -- Indication: For Chronic low back pain without sciatica, unspecified back pain laterality    amiodarone  -- 200 milligram(s) by mouth 2 times a day  -- Indication: For Atrial fibrillation    Eliquis  -- 2.5 milligram(s) by mouth 2 times a day  -- Indication: For Atrial fibrillation    lidocaine 5% topical film  -- Apply on skin to affected area once a day  -- Indication: For Chronic low back pain without sciatica, unspecified back pain laterality    hydroCHLOROthiazide 12.5 mg oral capsule  -- 1 cap(s) by mouth once a day  -- Indication: For High blood pressure    polyethylene glycol 3350 oral powder for reconstitution  -- 17 gram(s) by mouth 2 times a day  -- Indication: For Constipation, unspecified constipation type    senna oral tablet  -- 2 tab(s) by mouth once a day (at bedtime), As needed, Constipation  -- Indication: For Constipation, unspecified constipation type    docusate sodium 100 mg oral capsule  -- 1 cap(s) by mouth 3 times a day  -- Indication: For Constipation, unspecified constipation type    levothyroxine  -- 0.25 microgram(s) by mouth once a day  -- Indication: For Hypothyroidism

## 2018-08-22 DIAGNOSIS — E03.9 HYPOTHYROIDISM, UNSPECIFIED: ICD-10-CM

## 2018-08-22 DIAGNOSIS — M48.061 SPINAL STENOSIS, LUMBAR REGION WITHOUT NEUROGENIC CLAUDICATION: ICD-10-CM

## 2018-08-22 DIAGNOSIS — M21.371 FOOT DROP, RIGHT FOOT: ICD-10-CM
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
## 2018-08-22 DIAGNOSIS — M54.9 DORSALGIA, UNSPECIFIED: ICD-10-CM

## 2018-08-22 DIAGNOSIS — I48.91 UNSPECIFIED ATRIAL FIBRILLATION: ICD-10-CM

## 2018-08-22 DIAGNOSIS — Z85.51 PERSONAL HISTORY OF MALIGNANT NEOPLASM OF BLADDER: ICD-10-CM

## 2018-08-22 DIAGNOSIS — M48.54XD COLLAPSED VERTEBRA, NOT ELSEWHERE CLASSIFIED, THORACIC REGION, SUBSEQUENT ENCOUNTER FOR FRACTURE WITH ROUTINE HEALING: ICD-10-CM

## 2018-08-22 DIAGNOSIS — I10 ESSENTIAL (PRIMARY) HYPERTENSION: ICD-10-CM

## 2018-08-22 DIAGNOSIS — Z87.891 PERSONAL HISTORY OF NICOTINE DEPENDENCE: ICD-10-CM
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
## 2018-08-22 DIAGNOSIS — M47.816 SPONDYLOSIS WITHOUT MYELOPATHY OR RADICULOPATHY, LUMBAR REGION: ICD-10-CM

## 2020-05-13 NOTE — ED ADULT NURSE NOTE - TEMPLATE
Back Pain
Detail Level: Simple
Additional Notes: Patient consent was obtained to proceed with the visit and recommended plan of care after discussion of all risks and benefits, including the risks of COVID-19 exposure.\\n\\n\\n• Have you been diagnosed with COVID-19? - NO\\n• Have you been exposed to someone with COVID-19? - NO\\n• In the past 2 weeks, have you had any of the following symptoms? - NO Fever or temperature greater than 100 Cough\\nShortness of breath\\nMuscle aches\\nFatigue\\n• In the past 30 days have you travelled to/on any of the following? - NO [place]/ [place]\\n[place]\\n[place]ise ship

## 2023-01-21 NOTE — PROGRESS NOTE ADULT - PROBLEM SELECTOR PLAN 6
Patient reports worsening constipation over past 1-2 weeks with no BM for 6 days until 2 days prior to admission.  - TSH  - Colace TID, senna qhs, Miralax BID  - advance bowel regimen as tolerated 167.64 Patient reports worsening constipation over past 1-2 weeks with no BM for 6 days until 2 days prior to admission.  - TSH normal  - Colace TID, senna qhs, Miralax BID  - advance bowel regimen as tolerated
